# Patient Record
Sex: MALE | Race: WHITE | NOT HISPANIC OR LATINO | Employment: FULL TIME | URBAN - METROPOLITAN AREA
[De-identification: names, ages, dates, MRNs, and addresses within clinical notes are randomized per-mention and may not be internally consistent; named-entity substitution may affect disease eponyms.]

---

## 2017-06-19 DIAGNOSIS — N40.1 BENIGN NON-NODULAR PROSTATIC HYPERPLASIA WITH LOWER URINARY TRACT SYMPTOMS: ICD-10-CM

## 2017-06-19 NOTE — TELEPHONE ENCOUNTER
Mychart sent to see if dose increase has been helpful    Flomax         Last Written Prescription Date: 11/3/16  Last Fill Quantity: 180, # refills: 1    Last Office Visit with [facility], [facility] or [facility] prescribing provider:  11/3/16   Future Office Visit:      BP Readings from Last 3 Encounters:   11/03/16 120/70   09/19/16 110/60   02/26/16 112/82

## 2017-06-20 RX ORDER — TAMSULOSIN HYDROCHLORIDE 0.4 MG/1
CAPSULE ORAL
Qty: 180 CAPSULE | Refills: 0 | Status: SHIPPED | OUTPATIENT
Start: 2017-06-20 | End: 2017-09-14

## 2017-06-20 NOTE — TELEPHONE ENCOUNTER
Spoke with patient and he states that it is working very well for him    Prescription approved per [facility] Refill Protocol.  Suzie Timmons RN, BSN

## 2017-09-14 DIAGNOSIS — N40.1 BENIGN NON-NODULAR PROSTATIC HYPERPLASIA WITH LOWER URINARY TRACT SYMPTOMS: ICD-10-CM

## 2017-09-14 RX ORDER — TAMSULOSIN HYDROCHLORIDE 0.4 MG/1
CAPSULE ORAL
Qty: 180 CAPSULE | Refills: 0 | Status: SHIPPED | OUTPATIENT
Start: 2017-09-14 | End: 2017-12-15

## 2017-09-14 NOTE — TELEPHONE ENCOUNTER
Prescription approved per [facility] Refill Protocol.  Suzie Timmons RN, BSN

## 2017-09-14 NOTE — TELEPHONE ENCOUNTER
Pending Prescriptions:                       Disp   Refills    tamsulosin (FLOMAX) 0.4 MG capsule [Pharm*180 ca*0            Sig: TAKE 2 CAPSULES (0.8 MG) BY MOUTH DAILY             Last Written Prescription Date: 06/20/2017  Last Fill Quantity: 180, # refills: 0    Last Office Visit with [facility], [facility] or [facility] prescribing provider:  11/03/2016   Future Office Visit:      BP Readings from Last 3 Encounters:   11/03/16 120/70   09/19/16 110/60   02/26/16 112/82     Bill BOGGS

## 2017-11-11 ENCOUNTER — OFFICE VISIT (OUTPATIENT)
Dept: URGENT CARE | Facility: URGENT CARE | Age: 57
End: 2017-11-11
Payer: COMMERCIAL

## 2017-11-11 VITALS
HEIGHT: 70 IN | WEIGHT: 190 LBS | OXYGEN SATURATION: 96 % | DIASTOLIC BLOOD PRESSURE: 60 MMHG | TEMPERATURE: 97.2 F | SYSTOLIC BLOOD PRESSURE: 102 MMHG | BODY MASS INDEX: 27.2 KG/M2 | HEART RATE: 50 BPM

## 2017-11-11 DIAGNOSIS — R31.0 GROSS HEMATURIA: Primary | ICD-10-CM

## 2017-11-11 LAB
ALBUMIN UR-MCNC: >=300 MG/DL
APPEARANCE UR: ABNORMAL
BACTERIA #/AREA URNS HPF: ABNORMAL /HPF
BILIRUB UR QL STRIP: NEGATIVE
COLOR UR AUTO: ABNORMAL
GLUCOSE UR STRIP-MCNC: NEGATIVE MG/DL
HGB UR QL STRIP: ABNORMAL
KETONES UR STRIP-MCNC: NEGATIVE MG/DL
LEUKOCYTE ESTERASE UR QL STRIP: ABNORMAL
NITRATE UR QL: NEGATIVE
PH UR STRIP: 7 PH (ref 5–7)
RBC #/AREA URNS AUTO: ABNORMAL /HPF
SOURCE: ABNORMAL
SP GR UR STRIP: 1.02 (ref 1–1.03)
UROBILINOGEN UR STRIP-ACNC: 0.2 EU/DL (ref 0.2–1)
WBC #/AREA URNS AUTO: ABNORMAL /HPF

## 2017-11-11 PROCEDURE — 99213 OFFICE O/P EST LOW 20 MIN: CPT | Performed by: FAMILY MEDICINE

## 2017-11-11 PROCEDURE — 87086 URINE CULTURE/COLONY COUNT: CPT | Performed by: FAMILY MEDICINE

## 2017-11-11 PROCEDURE — 81001 URINALYSIS AUTO W/SCOPE: CPT | Performed by: FAMILY MEDICINE

## 2017-11-11 RX ORDER — SULFAMETHOXAZOLE/TRIMETHOPRIM 800-160 MG
1 TABLET ORAL 2 TIMES DAILY
Qty: 14 TABLET | Refills: 0 | Status: SHIPPED | OUTPATIENT
Start: 2017-11-11 | End: 2017-11-18

## 2017-11-11 NOTE — PATIENT INSTRUCTIONS
What is Hematuria?  Blood in your urine is a condition known as hematuria. Most of the time, the cause of hematuria is not serious. But, never ignore blood in the urine. Your doctor can evaluate you to find the cause of the bleeding and treat it, if needed.  Types of hematuria    Gross hematuria means that the blood can be seen by the naked eye. The urine may look pinkish, brownish, or bright red.    Microscopic hematuria means that the urine is clear, but blood cells can be seen when urine is looked at under a microscope or tested in a lab.  Both types of hematuria can have the same causes. Neither one is necessarily more serious than the other. With either type, you may have other symptoms, such as pain, pressure, or burning when you urinate, abdominal pain, or back pain. Or, you may not have any other symptoms. No matter how much blood is found, the cause of the bleeding needs to be identified.  Finding the cause of hematuria  To evaluate your condition, your doctor will first confirm that blood is indeed present. Then other tests are done to pinpoint where the blood is coming from and why. Your doctor will decide which tests will best determine the cause of your hematuria. Some common tests are listed below.    History and physical exam    Lab tests may include urinalysis, a urine culture, a urine cytology, and various blood tests    Cystoscopy    Computed tomography (CT) or CT urography    Magnetic resonance imaging (MRI) or MR urography    Ultrasound of the kidney    Kidney biopsy  Causes of hematuria include the very benign (exercised induced hematuria) to the very severe (cancer of the urinary system). A variety of treatments are available depending on the cause.  Date Last Reviewed: 12/2/2016 2000-2017 [employer] [address] [place] [address], [place] [address]. All rights reserved. This information is not intended as a substitute for professional medical care. Always follow your healthcare  professional's instructions.        Hematuria: Possible Causes     Many things can lead to blood in the urine (hematuria). The blood may be seen with the eye (macroscopic or gross hematuria). Or it may only be seen when the urine is looked at under a microscope (microscopic hematuria). Some of the most common causes of blood in the urine are listed below. Often, no cause for the blood can be found. This is called idiopathic hematuria.    Kidney or bladder stones are collections of crystals. They form in the urine. Stones may be found anywhere in the urinary tract. But they form most often in the kidneys or bladder. In addition to blood in the urine, they can cause severe pain.    BPH stands for benign prostatic hyperplasia. It is enlargement of the prostate gland. It happens as men age. BPH often causes problems with urination. It sometimes causes blood in the urine.    A urinary tract infection (UTI) is due to bacteria growing in the urinary tract. It can cause blood in the urine. Other symptoms include burning or pain with urination. You may need to urinate often or urgently. You may also have a fever.    Damage to the urinary tract may cause blood in the urine. This damage may be due to a blow or accident. It may also result from the use of a urinary catheter. Very hard exercise may sometimes irritate the urinary tract and cause bleeding.    Cancer may occur anywhere in the urinary tract. A tumor may sometimes cause no symptoms other than bleeding.     Other possible causes of bleeding include:    Prostatitis (infection of the prostate gland)    Taking anticoagulants    Blockage in the urinary tract    Disease or inflammation of the kidney    Cystic diseases of the kidneys    Sickle cell anemia    Vigorous exercise    Endometriosis  Date Last Reviewed: 12/1/2016 2000-2017 [employer]. [address] [place] [address], [place] [address]. All rights reserved. This information is not intended as a substitute  for professional medical care. Always follow your healthcare professional's instructions.

## 2017-11-11 NOTE — MR AVS SNAPSHOT
After Visit Summary   11/11/2017    Bill Neri    MRN: [id number]           Patient Information     Date Of Birth          1960        Visit Information        Provider Department      11/11/2017 8:05 AM Kendra Westbrook MD [facility]        Today's Diagnoses     Gross hematuria    -  1      Care Instructions      What is Hematuria?  Blood in your urine is a condition known as hematuria. Most of the time, the cause of hematuria is not serious. But, never ignore blood in the urine. Your doctor can evaluate you to find the cause of the bleeding and treat it, if needed.  Types of hematuria    Gross hematuria means that the blood can be seen by the naked eye. The urine may look pinkish, brownish, or bright red.    Microscopic hematuria means that the urine is clear, but blood cells can be seen when urine is looked at under a microscope or tested in a lab.  Both types of hematuria can have the same causes. Neither one is necessarily more serious than the other. With either type, you may have other symptoms, such as pain, pressure, or burning when you urinate, abdominal pain, or back pain. Or, you may not have any other symptoms. No matter how much blood is found, the cause of the bleeding needs to be identified.  Finding the cause of hematuria  To evaluate your condition, your doctor will first confirm that blood is indeed present. Then other tests are done to pinpoint where the blood is coming from and why. Your doctor will decide which tests will best determine the cause of your hematuria. Some common tests are listed below.    History and physical exam    Lab tests may include urinalysis, a urine culture, a urine cytology, and various blood tests    Cystoscopy    Computed tomography (CT) or CT urography    Magnetic resonance imaging (MRI) or MR urography    Ultrasound of the kidney    Kidney biopsy  Causes of hematuria include the very benign (exercised induced hematuria)  to the very severe (cancer of the urinary system). A variety of treatments are available depending on the cause.  Date Last Reviewed: 12/2/2016 2000-2017 The "[employer] [address] [place] [address], [place] [address]. All rights reserved. This information is not intended as a substitute for professional medical care. Always follow your healthcare professional's instructions.        Hematuria: Possible Causes     Many things can lead to blood in the urine (hematuria). The blood may be seen with the eye (macroscopic or gross hematuria). Or it may only be seen when the urine is looked at under a microscope (microscopic hematuria). Some of the most common causes of blood in the urine are listed below. Often, no cause for the blood can be found. This is called idiopathic hematuria.    Kidney or bladder stones are collections of crystals. They form in the urine. Stones may be found anywhere in the urinary tract. But they form most often in the kidneys or bladder. In addition to blood in the urine, they can cause severe pain.    BPH stands for benign prostatic hyperplasia. It is enlargement of the prostate gland. It happens as men age. BPH often causes problems with urination. It sometimes causes blood in the urine.    A urinary tract infection (UTI) is due to bacteria growing in the urinary tract. It can cause blood in the urine. Other symptoms include burning or pain with urination. You may need to urinate often or urgently. You may also have a fever.    Damage to the urinary tract may cause blood in the urine. This damage may be due to a blow or accident. It may also result from the use of a urinary catheter. Very hard exercise may sometimes irritate the urinary tract and cause bleeding.    Cancer may occur anywhere in the urinary tract. A tumor may sometimes cause no symptoms other than bleeding.     Other possible causes of bleeding include:    Prostatitis (infection of the prostate gland)    Taking  "anticoagulants    Blockage in the urinary tract    Disease or inflammation of the kidney    Cystic diseases of the kidneys    Sickle cell anemia    Vigorous exercise    Endometriosis  Date Last Reviewed: 12/1/2016 2000-2017 [employer]. [address] [place] [address], [place] [address]. All rights reserved. This information is not intended as a substitute for professional medical care. Always follow your healthcare professional's instructions.                Follow-ups after your visit        Who to contact     If you have questions or need follow up information about today's clinic visit or your schedule please contact [facility] directly at [phone].  Normal or non-critical lab and imaging results will be communicated to you by [employer], letter or phone within 4 business days after the clinic has received the results. If you do not hear from us within 7 days, please contact the clinic through [employer] or phone. If you have a critical or abnormal lab result, we will notify you by phone as soon as possible.  Submit refill requests through [employer] or call your pharmacy and they will forward the refill request to us. Please allow 3 business days for your refill to be completed.          Additional Information About Your Visit        MyChart Information     [employer] gives you secure access to your electronic health record. If you see a primary care provider, you can also send messages to your care team and make appointments. If you have questions, please call your primary care clinic.  If you do not have a primary care provider, please call [phone] and they will assist you.        Care EveryWhere ID     This is your Care EveryWhere ID. This could be used by other organizations to access your [facility] medical records  [id number]        Your Vitals Were     Pulse Temperature Height Pulse Oximetry BMI (Body Mass Index)       50 97.2  F (36.2  C) (Oral) 5' 10\" (1.778 m) 96% 27.26 kg/m2  "       Blood Pressure from Last 3 Encounters:   11/11/17 102/60   11/03/16 120/70   09/19/16 110/60    Weight from Last 3 Encounters:   11/11/17 190 lb (86.2 kg)   11/03/16 199 lb 12.8 oz (90.6 kg)   09/19/16 197 lb 1.6 oz (89.4 kg)              We Performed the Following     *UA reflex to Microscopic and Culture ([place] (except [place] and [place])     Urine Culture Aerobic Bacterial     Urine Microscopic          Today's Medication Changes          These changes are accurate as of: 11/11/17  8:48 AM.  If you have any questions, ask your nurse or doctor.               Start taking these medicines.        Dose/Directions    sulfamethoxazole-trimethoprim 800-160 MG per tablet   Commonly known as:  BACTRIM DS   Used for:  Gross hematuria   Started by:  Kendra Westbrook MD        Dose:  1 tablet   Take 1 tablet by mouth 2 times daily for 7 days   Quantity:  14 tablet   Refills:  0            Where to get your medicines      These medications were sent to [id number]    Hours:  Tech issues with their phone system Phone:  [phone]     sulfamethoxazole-trimethoprim 800-160 MG per tablet                Primary Care Provider Office Phone # Fax #    Jose Ruiz -[phone] [address] [facility] [address]        Equal Access to Services     DEVORAH [facility] CenterCHRISTOPHE AH: Hadii aad ku hadasho Soomaali, waaxda luqadaha, qaybta kaalmada adeegyada, tomasz luis. So [facility] [phone].    ATENCIÓN: Si habla español, tiene a clarke disposición servicios gratuitos de asistencia lingüística. Llame al [phone].    We comply with applicable federal civil rights laws and [place] laws. We do not discriminate on the basis of race, color, national origin, age, disability, sex, sexual orientation, or gender identity.            Thank you!     Thank you for choosing [facility]  for your care. Our goal is always to provide you with excellent care. Hearing back from our patients is one way we can continue to improve our services. Please take a few minutes to complete the written survey that you may receive in the mail after your visit with us. Thank you!             Your Updated Medication List - Protect others around you: Learn how to safely use, store and throw away your medicines at www.disposemymeds.org.          This list is accurate as of: 11/11/17  8:48 AM.  Always use your most recent med list.                   Brand Name Dispense Instructions for use Diagnosis    ADVIL PO      Take 200 mg by mouth as needed for moderate pain        sulfamethoxazole-trimethoprim 800-160 MG per tablet    BACTRIM DS    14 tablet    Take 1 tablet by mouth 2 times daily for 7 days    Gross hematuria       tamsulosin 0.4 MG capsule    FLOMAX    180 capsule    TAKE 2 CAPSULES (0.8 MG) BY MOUTH DAILY    Benign non-nodular prostatic hyperplasia with lower urinary tract symptoms

## 2017-11-11 NOTE — PROGRESS NOTES
"SUBJECTIVE:  Chief Complaint   Patient presents with     Urinary Problem     back pain, hematuria onset today - declines GC testing      Urgent Care      Bill Neri is a 57 year old male who  presents today for blood in the urine  Symptoms of dysuria and blood in the urine have been going on for 1day(s).     sudden onset and still presentand mild.  There is no history of fever, chills, nausea or vomiting.  No history of  penile discharge. This patient does   have a history of urinary tract infections/  BPH. Patient denies long duration, rigors, flank pain and temperature > 101 degrees F.   He feels he is able to empty his bladder adequately    Past Medical History:   Diagnosis Date     Anxiety state, unspecified      Other and unspecified hyperlipidemia        ALLERGIES:  Review of patient's allergies indicates no known allergies.      Current Outpatient Prescriptions on File Prior to Visit:  tamsulosin (FLOMAX) 0.4 MG capsule TAKE 2 CAPSULES (0.8 MG) BY MOUTH DAILY   Ibuprofen (ADVIL PO) Take 200 mg by mouth as needed for moderate pain     No current facility-administered medications on file prior to visit.     Social History   Substance Use Topics     Smoking status: Never Smoker     Smokeless tobacco: Never Used     Alcohol use Yes      Comment: 4 beers a week       Family History   Problem Relation Age of Onset     Family History Negative No family hx of      Cancer - colorectal No family hx of        ROS:   CONSTITUTIONAL:NEGATIVE for fever, chills, change in weight  INTEGUMENTARY/SKIN: NEGATIVE for worrisome rashes, moles or lesions  ENT/MOUTH: NEGATIVE for ear, mouth and throat problems  RESP:NEGATIVE for significant cough or SOB  GI: NEGATIVE for nausea, abdominal pain, heartburn, or change in bowel habits    OBJECTIVE:  /60 (BP Location: Right arm, Patient Position: Chair, Cuff Size: Adult Regular)  Pulse 50  Temp 97.2  F (36.2  C) (Oral)  Ht 5' 10\" (1.778 m)  Wt 190 lb (86.2 kg)  SpO2 96%  " BMI 27.26 kg/m2  GENERAL APPEARANCE: healthy, alert and no distress  RESP: lungs clear to auscultation - no rales, rhonchi or wheezes  CV: regular rates and rhythm, normal S1 S2, no murmur noted  ABDOMEN:  soft, nontender, no HSM or masses and bowel sounds normal  BACK: No CVA tenderness  SKIN: no suspicious lesions or rashes  Declined prostate/ testicular exam    Results for orders placed or performed in visit on 11/11/17   *UA reflex to Microscopic and Culture ([facility] (except [place] and [place])   Result Value Ref Range    Color Urine Red     Appearance Urine Cloudy     Glucose Urine Negative NEG^Negative mg/dL    Bilirubin Urine Negative NEG^Negative    Ketones Urine Negative NEG^Negative mg/dL    Specific Gravity Urine 1.025 1.003 - 1.035    Blood Urine Large (A) NEG^Negative    pH Urine 7.0 5.0 - 7.0 pH    Protein Albumin Urine >=300 (A) NEG^Negative mg/dL    Urobilinogen Urine 0.2 0.2 - 1.0 EU/dL    Nitrite Urine Negative NEG^Negative    Leukocyte Esterase Urine Large (A) NEG^Negative    Source Midstream Urine    Urine Microscopic   Result Value Ref Range    WBC Urine 5-10 (A) OTO2^O - 2 /HPF    RBC Urine  (A) OTO2^O - 2 /HPF    Bacteria Urine Few (A) NEG^Negative /HPF         ASSESSMENT:   Gross hematuria      - *UA reflex to Microscopic and Culture ([place] [facility] (except [place] and [place])  - Urine Microscopic  - Urine Culture Aerobic Bacterial  - sulfamethoxazole-trimethoprim (BACTRIM DS) 800-160 MG per tablet; Take 1 tablet by mouth 2 times daily for 7 days   :  We discussed the different possible causes of hematuria and that a UTI is the most common cause.  Other causes include trauma, anticoagulants, malignancy, kidney disease. .  Patient will be emprically started on antibiotics and will monitor if hematuria resolves.  Urine culture will also be performed to look for urinary tract infection.  Patient advised to  Check/ call for urine culture  results.  Patient education materials about hematuria were provided   If persistent concerns about hematuria, pt. Will f/u with primary care and/ or arrange further evaluation with urology of other possible causes of hematuria

## 2017-11-11 NOTE — NURSING NOTE
"Bill Neri;   Chief Complaint   Patient presents with     Urinary Problem     back pain, hematuria onset today - declines GC testing      Urgent Care     Initial /60 (BP Location: Right arm, Patient Position: Chair, Cuff Size: Adult Regular)  Pulse 50  Temp 97.2  F (36.2  C) (Oral)  Ht 5' 10\" (1.778 m)  Wt 190 lb (86.2 kg)  SpO2 96%  BMI 27.26 kg/m2 Estimated body mass index is 27.26 kg/(m^2) as calculated from the following:    Height as of this encounter: 5' 10\" (1.778 m).    Weight as of this encounter: 190 lb (86.2 kg)..  BP completed using cuff size regular.  Monica Rossi R.N.  "

## 2017-11-12 LAB
BACTERIA SPEC CULT: NORMAL
SPECIMEN SOURCE: NORMAL

## 2017-12-01 ENCOUNTER — TELEPHONE (OUTPATIENT)
Dept: FAMILY MEDICINE | Facility: CLINIC | Age: 57
End: 2017-12-01

## 2017-12-01 ENCOUNTER — OFFICE VISIT (OUTPATIENT)
Dept: URGENT CARE | Facility: URGENT CARE | Age: 57
End: 2017-12-01
Payer: COMMERCIAL

## 2017-12-01 VITALS
OXYGEN SATURATION: 98 % | BODY MASS INDEX: 27.26 KG/M2 | TEMPERATURE: 98.2 F | DIASTOLIC BLOOD PRESSURE: 70 MMHG | SYSTOLIC BLOOD PRESSURE: 110 MMHG | HEART RATE: 76 BPM | WEIGHT: 190 LBS

## 2017-12-01 DIAGNOSIS — N30.01 ACUTE CYSTITIS WITH HEMATURIA: ICD-10-CM

## 2017-12-01 DIAGNOSIS — R82.90 NONSPECIFIC FINDING ON EXAMINATION OF URINE: Primary | ICD-10-CM

## 2017-12-01 DIAGNOSIS — R30.0 DYSURIA: ICD-10-CM

## 2017-12-01 DIAGNOSIS — R35.0 FREQUENCY OF URINATION: ICD-10-CM

## 2017-12-01 LAB
ALBUMIN UR-MCNC: 100 MG/DL
APPEARANCE UR: ABNORMAL
BACTERIA #/AREA URNS HPF: ABNORMAL /HPF
BILIRUB UR QL STRIP: NEGATIVE
COLOR UR AUTO: YELLOW
GLUCOSE UR STRIP-MCNC: NEGATIVE MG/DL
HGB UR QL STRIP: ABNORMAL
KETONES UR STRIP-MCNC: NEGATIVE MG/DL
LEUKOCYTE ESTERASE UR QL STRIP: ABNORMAL
NITRATE UR QL: NEGATIVE
PH UR STRIP: 7.5 PH (ref 5–7)
RBC #/AREA URNS AUTO: ABNORMAL /HPF
SOURCE: ABNORMAL
SP GR UR STRIP: 1.02 (ref 1–1.03)
UROBILINOGEN UR STRIP-ACNC: 0.2 EU/DL (ref 0.2–1)
WBC #/AREA URNS AUTO: ABNORMAL /HPF

## 2017-12-01 PROCEDURE — 81001 URINALYSIS AUTO W/SCOPE: CPT | Performed by: FAMILY MEDICINE

## 2017-12-01 PROCEDURE — 99213 OFFICE O/P EST LOW 20 MIN: CPT | Performed by: FAMILY MEDICINE

## 2017-12-01 PROCEDURE — 87086 URINE CULTURE/COLONY COUNT: CPT | Performed by: FAMILY MEDICINE

## 2017-12-01 RX ORDER — CEFDINIR 300 MG/1
300 CAPSULE ORAL 2 TIMES DAILY
Qty: 20 CAPSULE | Refills: 0 | Status: SHIPPED | OUTPATIENT
Start: 2017-12-01 | End: 2018-01-12

## 2017-12-01 NOTE — MR AVS SNAPSHOT
After Visit Summary   12/1/2017    Bill Neri    MRN: [id number]           Patient Information     Date Of Birth          1960        Visit Information        Provider Department      12/1/2017 5:55 PM Kendra Westbrook MD [facility] [place] [facility]        Today's Diagnoses     Nonspecific finding on examination of urine    -  1    Frequency of urination        Dysuria        Acute cystitis with hematuria          Care Instructions      Understanding Urinary Tract Infections (UTIs)  Most UTIs are caused by bacteria, although they may also be caused by viruses or fungi. Bacteria from the bowel are the most common source of infection. The infection may start because of any of the following:    Sexual activity. During sex, bacteria can travel from the penis, vagina, or rectum into the urethra.     Bacteria on the skin outside the rectum may travel into the urethra. This is more common in women since the rectum and urethra are closer to each other than in men. Wiping from front to back after using the toilet and keeping the area clean can help prevent germs from getting to the urethra.    Blockage of urine flow through the urinary tract. If urine sits too long, germs may start to grow out of control.      Parts of the urinary tract  The infection can occur in any part of the urinary tract.    The kidneys collect and store urine.    The ureters carry urine from the kidneys to the bladder.    The bladder holds urine until you are ready to let it out.    The urethra carries urine from the bladder out of the body. It is shorter in women, so bacteria can move through it more easily. The urethra is longer in men, so a UTI is less likely to reach the bladder or kidneys in men.  Date Last Reviewed: 1/1/2017 2000-2017 [employer]. [address] [place] [address], [place] [address]. All rights reserved. This information is not intended as a substitute for professional medical care. Always  follow your healthcare professional's instructions.        Understanding Urinary Tract Infections (UTIs)  Most UTIs are caused by bacteria, although they may also be caused by viruses or fungi. Bacteria from the bowel are the most common source of infection. The infection may start because of any of the following:    Sexual activity. During sex, bacteria can travel from the penis, vagina, or rectum into the urethra.     Bacteria on the skin outside the rectum may travel into the urethra. This is more common in women since the rectum and urethra are closer to each other than in men. Wiping from front to back after using the toilet and keeping the area clean can help prevent germs from getting to the urethra.    Blockage of urine flow through the urinary tract. If urine sits too long, germs may start to grow out of control.      Parts of the urinary tract  The infection can occur in any part of the urinary tract.    The kidneys collect and store urine.    The ureters carry urine from the kidneys to the bladder.    The bladder holds urine until you are ready to let it out.    The urethra carries urine from the bladder out of the body. It is shorter in women, so bacteria can move through it more easily. The urethra is longer in men, so a UTI is less likely to reach the bladder or kidneys in men.  Date Last Reviewed: 1/1/2017 2000-2017 [employer]. [address] [place] [address], [place] [address]. All rights reserved. This information is not intended as a substitute for professional medical care. Always follow your healthcare professional's instructions.                Follow-ups after your visit        Who to contact     If you have questions or need follow up information about today's clinic visit or your schedule please contact [facility] directly at [phone].  Normal or non-critical lab and imaging results will be communicated to you by MyChart, letter or phone within 4 business days  after the clinic has received the results. If you do not hear from us within 7 days, please contact the clinic through [employer] or phone. If you have a critical or abnormal lab result, we will notify you by phone as soon as possible.  Submit refill requests through [employer] or call your pharmacy and they will forward the refill request to us. Please allow 3 business days for your refill to be completed.          Additional Information About Your Visit        joblocalharCamiant Information     [employer] gives you secure access to your electronic health record. If you see a primary care provider, you can also send messages to your care team and make appointments. If you have questions, please call your primary care clinic.  If you do not have a primary care provider, please call [phone] and they will assist you.        Care EveryWhere ID     This is your Care EveryWhere ID. This could be used by other organizations to access your [place] medical records  [id number]        Your Vitals Were     Pulse Temperature Pulse Oximetry BMI (Body Mass Index)          76 98.2  F (36.8  C) (Oral) 98% 27.26 kg/m2         Blood Pressure from Last 3 Encounters:   12/01/17 110/70   11/11/17 102/60   11/03/16 120/70    Weight from Last 3 Encounters:   12/01/17 190 lb (86.2 kg)   11/11/17 190 lb (86.2 kg)   11/03/16 199 lb 12.8 oz (90.6 kg)              We Performed the Following     UA reflex to Microscopic and Culture     Urine Culture Aerobic Bacterial     Urine Microscopic          Today's Medication Changes          These changes are accurate as of: 12/1/17  6:40 PM.  If you have any questions, ask your nurse or doctor.               Start taking these medicines.        Dose/Directions    cefdinir 300 MG capsule   Commonly known as:  OMNICEF   Used for:  Acute cystitis with hematuria   Started by:  Kendra Westbrook MD        Dose:  300 mg   Take 1 capsule (300 mg) by mouth 2 times daily   Quantity:  20 capsule   Refills:  0             Where to get your medicines      These medications were sent to [facility] [address] [place] [facility] [address] [facility]  [address] [facility] [address]    Hours:  Tech issues with their phone system Phone:  [phone]     cefdinir 300 MG capsule                Primary Care Provider Office Phone # Fax #    Jose Ruiz [phone] [address]  [facility] [address]        Equal Access to Services     STEPHIE JULIO : Hadii aad ku hadasho Soomaali, waaxda luqadaha, qaybta kaalmada adeegyada, waxay idiin hayaan adeeg kharash la'lionel . So [facility] [phone].    ATENCIÓN: Si habla español, tiene a clarke disposición servicios gratuitos de asistencia lingüística. [facility] [phone].    We comply with applicable federal civil rights laws and [place] laws. We do not discriminate on the basis of race, color, national origin, age, disability, sex, sexual orientation, or gender identity.            Thank you!     Thank you for choosing [facility]  for your care. Our goal is always to provide you with excellent care. Hearing back from our patients is one way we can continue to improve our services. Please take a few minutes to complete the written survey that you may receive in the mail after your visit with us. Thank you!             Your Updated Medication List - Protect others around you: Learn how to safely use, store and throw away your medicines at www.disposemymeds.org.          This list is accurate as of: 12/1/17  6:40 PM.  Always use your most recent med list.                   Brand Name Dispense Instructions for use Diagnosis    ADVIL PO      Take 200 mg by mouth as needed for moderate pain        cefdinir 300 MG capsule    OMNICEF    20 capsule    Take 1 capsule (300 mg) by mouth 2 times daily    Acute cystitis with hematuria       tamsulosin 0.4 MG capsule    FLOMAX    180 capsule    TAKE 2 CAPSULES (0.8 MG) BY MOUTH DAILY    Benign non-nodular  prostatic hyperplasia with lower urinary tract symptoms

## 2017-12-01 NOTE — TELEPHONE ENCOUNTER
Bill Neri is a 57 year old male who calls with dysuria.  He wonders if Dr. Ruiz would treat over the phone?  Pt is flying out of town tomorrow.  Dr. Ruiz, pt asked if you would treat over the phone? Informed unlikely but that we would ask you.     Description:  Urinary urgency, cloudy urine, pain with urination  Onset/duration:  Late last evening  Taking Flomax as prescribed since 11/11/17 UC visit but if he misses a dose his symptoms flare  Associated symptoms:  A little bit of back pain    We recommended same day or UC. Offered same day appointment. Schedule conflict.  Pt will present to  UC 5 PM unless PCP treats w/o visit.  Cornell Strong RN

## 2017-12-01 NOTE — TELEPHONE ENCOUNTER
Recommend eval for infection in UC.  We referred him to urology last time I saw him 1 year ago and would recommend that as well.

## 2017-12-02 NOTE — PATIENT INSTRUCTIONS
Understanding Urinary Tract Infections (UTIs)  Most UTIs are caused by bacteria, although they may also be caused by viruses or fungi. Bacteria from the bowel are the most common source of infection. The infection may start because of any of the following:    Sexual activity. During sex, bacteria can travel from the penis, vagina, or rectum into the urethra.     Bacteria on the skin outside the rectum may travel into the urethra. This is more common in women since the rectum and urethra are closer to each other than in men. Wiping from front to back after using the toilet and keeping the area clean can help prevent germs from getting to the urethra.    Blockage of urine flow through the urinary tract. If urine sits too long, germs may start to grow out of control.      Parts of the urinary tract  The infection can occur in any part of the urinary tract.    The kidneys collect and store urine.    The ureters carry urine from the kidneys to the bladder.    The bladder holds urine until you are ready to let it out.    The urethra carries urine from the bladder out of the body. It is shorter in women, so bacteria can move through it more easily. The urethra is longer in men, so a UTI is less likely to reach the bladder or kidneys in men.  Date Last Reviewed: 1/1/2017 2000-2017 [employer]. [address] [place] [address], [place] [id number]. All rights reserved. This information is not intended as a substitute for professional medical care. Always follow your healthcare professional's instructions.        Understanding Urinary Tract Infections (UTIs)  Most UTIs are caused by bacteria, although they may also be caused by viruses or fungi. Bacteria from the bowel are the most common source of infection. The infection may start because of any of the following:    Sexual activity. During sex, bacteria can travel from the penis, vagina, or rectum into the urethra.     Bacteria on the skin outside the rectum may  travel into the urethra. This is more common in women since the rectum and urethra are closer to each other than in men. Wiping from front to back after using the toilet and keeping the area clean can help prevent germs from getting to the urethra.    Blockage of urine flow through the urinary tract. If urine sits too long, germs may start to grow out of control.      Parts of the urinary tract  The infection can occur in any part of the urinary tract.    The kidneys collect and store urine.    The ureters carry urine from the kidneys to the bladder.    The bladder holds urine until you are ready to let it out.    The urethra carries urine from the bladder out of the body. It is shorter in women, so bacteria can move through it more easily. The urethra is longer in men, so a UTI is less likely to reach the bladder or kidneys in men.  Date Last Reviewed: 1/1/2017 2000-2017 [employer]. [address] [place] [address], [place] [address]. All rights reserved. This information is not intended as a substitute for professional medical care. Always follow your healthcare professional's instructions.

## 2017-12-02 NOTE — NURSING NOTE
"Bill Neri is a 57 year old male.      Chief Complaint   Patient presents with     Urgent Care     Urinary Problem     pt is here for a possible UTI - he had one a couple weeks ago and was treated - the sx never went away 100% - he now has back pain, cluody urine and frequency        Initial /70 (BP Location: Right arm, Cuff Size: Adult Large)  Pulse 76  Temp 98.2  F (36.8  C) (Oral)  Wt 190 lb (86.2 kg)  SpO2 98%  BMI 27.26 kg/m2 Estimated body mass index is 27.26 kg/(m^2) as calculated from the following:    Height as of 11/11/17: 5' 10\" (1.778 m).    Weight as of this encounter: 190 lb (86.2 kg).  Medication Reconciliation: complete      Questioned patient about current smoking habits.  Pt. has never smoked.      Karlene Cintron CMA      "

## 2017-12-02 NOTE — PROGRESS NOTES
SUBJECTIVE:  Chief Complaint   Patient presents with     Urgent Care     Urinary Problem     pt is here for a possible UTI - he had one a couple weeks ago and was treated - the sx never went away 100% - he now has back pain, cluody urine and frequency         Bill Neri is a 57 year old male who  presents today for a possible UTI. Symptoms of dysuria, urgency, frequency and burning have been going on for 1month(s).  Hematuria no.  gradual onset, still present and constantand moderate.  There is no history of fever, chills, nausea or vomiting.  No history of   penile discharge. This patient does   have a history of urinary tract infections.  He had hematuria 3 weeks ago-  Treated with bactrim,  And culture showed normal genital nalini.  He felt that the dysuria symptoms did not resolve with the bactrim, though hematuria resolved. Patient denies rigors, flank pain, temperature > 101 degrees F. and Vomiting, significant nausea or diarrhea  .  No pain in region of the prostate, no testicle pain    Past Medical History:   Diagnosis Date     Anxiety state, unspecified      Other and unspecified hyperlipidemia        ALLERGIES:  Review of patient's allergies indicates no known allergies.      Current Outpatient Prescriptions on File Prior to Visit:  tamsulosin (FLOMAX) 0.4 MG capsule TAKE 2 CAPSULES (0.8 MG) BY MOUTH DAILY   Ibuprofen (ADVIL PO) Take 200 mg by mouth as needed for moderate pain     No current facility-administered medications on file prior to visit.     Social History   Substance Use Topics     Smoking status: Never Smoker     Smokeless tobacco: Never Used     Alcohol use Yes      Comment: 4 beers a week       Family History   Problem Relation Age of Onset     Family History Negative No family hx of      Cancer - colorectal No family hx of        ROS:   CONSTITUTIONAL:NEGATIVE for fever, chills, change in weight  INTEGUMENTARY/SKIN: NEGATIVE for worrisome rashes, moles or lesions  EYES: NEGATIVE for  vision changes or irritation  ENT/MOUTH: NEGATIVE for ear, mouth and throat problems  RESP:NEGATIVE for significant cough or SOB  GI: NEGATIVE for nausea, abdominal pain, heartburn, or change in bowel habits    OBJECTIVE:  /70 (BP Location: Right arm, Cuff Size: Adult Large)  Pulse 76  Temp 98.2  F (36.8  C) (Oral)  Wt 190 lb (86.2 kg)  SpO2 98%  BMI 27.26 kg/m2  GENERAL APPEARANCE: healthy, alert and no distress  RESP: lungs clear to auscultation - no rales, rhonchi or wheezes  CV: regular rates and rhythm, normal S1 S2, no murmur noted  ABDOMEN:  soft, nontender, no HSM or masses and bowel sounds normal  BACK: No CVA tenderness  SKIN: no suspicious lesions or rashes      Results for orders placed or performed in visit on 12/01/17   UA reflex to Microscopic and Culture   Result Value Ref Range    Color Urine Yellow     Appearance Urine Cloudy     Glucose Urine Negative NEG^Negative mg/dL    Bilirubin Urine Negative NEG^Negative    Ketones Urine Negative NEG^Negative mg/dL    Specific Gravity Urine 1.025 1.003 - 1.035    Blood Urine Large (A) NEG^Negative    pH Urine 7.5 (H) 5.0 - 7.0 pH    Protein Albumin Urine 100 (A) NEG^Negative mg/dL    Urobilinogen Urine 0.2 0.2 - 1.0 EU/dL    Nitrite Urine Negative NEG^Negative    Leukocyte Esterase Urine Large (A) NEG^Negative    Source Midstream Urine    Urine Microscopic   Result Value Ref Range    WBC Urine 25-50 (A) OTO2^O - 2 /HPF    RBC Urine 25-50 (A) OTO2^O - 2 /HPF    Bacteria Urine Few (A) NEG^Negative /HPF     ASSESSMENT:   Frequency of urination     - UA reflex to Microscopic and Culture  - Urine Microscopic  - Urine Culture Aerobic Bacterial    Dysuria     - UA reflex to Microscopic and Culture  - Urine Culture Aerobic Bacterial    Nonspecific finding on examination of urine     - Urine Culture Aerobic Bacterial    Acute cystitis with hematuria     - cefdinir (OMNICEF) 300 MG capsule; Take 1 capsule (300 mg) by mouth 2 times daily       Drink plenty  of fluids.  Prevention and treatment of UTI's discussed.Signs and symptoms of pyelonephritis mentioned.  Follow up with primary care physician if not improving    We discussed that a urine culture is being performed and that if we find that if there is a bacteria in the urine that is resistant to the prescribed antibiotic he/she will receive call to change the antibiotic to better cover the infection.

## 2017-12-03 LAB
BACTERIA SPEC CULT: NO GROWTH
SPECIMEN SOURCE: NORMAL

## 2017-12-06 ENCOUNTER — TELEPHONE (OUTPATIENT)
Dept: FAMILY MEDICINE | Facility: CLINIC | Age: 57
End: 2017-12-06

## 2017-12-06 DIAGNOSIS — N30.01 ACUTE CYSTITIS WITH HEMATURIA: Primary | ICD-10-CM

## 2017-12-06 RX ORDER — SULFAMETHOXAZOLE/TRIMETHOPRIM 800-160 MG
1 TABLET ORAL 2 TIMES DAILY
Qty: 14 TABLET | Refills: 0 | Status: SHIPPED | OUTPATIENT
Start: 2017-12-06 | End: 2019-05-24

## 2017-12-06 NOTE — TELEPHONE ENCOUNTER
Pt returned call.  Make appointment for 12/19.  He would like this sent to:    [facility] [address] [place] [facility] [address] [facility]

## 2017-12-06 NOTE — TELEPHONE ENCOUNTER
Pt calling out of town stating he has not felt any improvement since being on antibiotic for 5 days.  He is still having the same symptoms that he had when he was in for UC.  He won't be back into town until Friday night.  He is wondering if he should be on a different medication    Please advise.    Suzie Timmons RN, BSN

## 2017-12-06 NOTE — TELEPHONE ENCOUNTER
I would have him consider Bactrim DS BID for 1 week and follow-up with me for follow-up in 10-14 days.

## 2017-12-09 ENCOUNTER — NURSE TRIAGE (OUTPATIENT)
Dept: NURSING | Facility: CLINIC | Age: 57
End: 2017-12-09

## 2017-12-09 NOTE — TELEPHONE ENCOUNTER
Robert is out of state right now and having blood in his urine and wanted to know what urology provider her saw 2-3 years ago, reviewed records and found information. Dr. Biju Che at [facility] In [place] and visit date was 02/16/15, he did not want further triage going to try to get back home today and go to urgent care and schedule follow up with urologist as well.     Dagmar Stoddard RN, BSN  [facility]

## 2017-12-15 DIAGNOSIS — N40.1 BENIGN NON-NODULAR PROSTATIC HYPERPLASIA WITH LOWER URINARY TRACT SYMPTOMS: ICD-10-CM

## 2017-12-15 RX ORDER — TAMSULOSIN HYDROCHLORIDE 0.4 MG/1
CAPSULE ORAL
Qty: 180 CAPSULE | Refills: 0 | Status: SHIPPED | OUTPATIENT
Start: 2017-12-15 | End: 2018-03-29

## 2017-12-15 NOTE — TELEPHONE ENCOUNTER
With patient's ongoing blood in urine (has appt with urology) is it ok to continue with this medication?    Suzie Timmons RN, BSN

## 2017-12-22 ENCOUNTER — OFFICE VISIT (OUTPATIENT)
Dept: UROLOGY | Facility: CLINIC | Age: 57
End: 2017-12-22
Payer: COMMERCIAL

## 2017-12-22 VITALS — OXYGEN SATURATION: 98 % | HEIGHT: 70 IN | HEART RATE: 48 BPM | BODY MASS INDEX: 27.2 KG/M2 | WEIGHT: 190 LBS

## 2017-12-22 DIAGNOSIS — R33.9 URINARY RETENTION: ICD-10-CM

## 2017-12-22 DIAGNOSIS — N40.0 ENLARGED PROSTATE: ICD-10-CM

## 2017-12-22 DIAGNOSIS — R31.0 GROSS HEMATURIA: Primary | ICD-10-CM

## 2017-12-22 LAB
ALBUMIN UR-MCNC: NEGATIVE MG/DL
APPEARANCE UR: CLEAR
BILIRUB UR QL STRIP: NEGATIVE
COLOR UR AUTO: YELLOW
GLUCOSE UR STRIP-MCNC: NEGATIVE MG/DL
HGB UR QL STRIP: ABNORMAL
KETONES UR STRIP-MCNC: NEGATIVE MG/DL
LEUKOCYTE ESTERASE UR QL STRIP: ABNORMAL
NITRATE UR QL: NEGATIVE
PH UR STRIP: 5.5 PH (ref 5–7)
RESIDUAL VOLUME (RV) (EXTERNAL): >999
SOURCE: ABNORMAL
SP GR UR STRIP: 1.02 (ref 1–1.03)
UROBILINOGEN UR STRIP-ACNC: 0.2 EU/DL (ref 0.2–1)

## 2017-12-22 PROCEDURE — 81003 URINALYSIS AUTO W/O SCOPE: CPT | Mod: QW | Performed by: UROLOGY

## 2017-12-22 PROCEDURE — 99214 OFFICE O/P EST MOD 30 MIN: CPT | Performed by: UROLOGY

## 2017-12-22 RX ORDER — FINASTERIDE 5 MG/1
5 TABLET, FILM COATED ORAL DAILY
Qty: 90 TABLET | Refills: 3 | Status: SHIPPED | OUTPATIENT
Start: 2017-12-22 | End: 2018-05-10

## 2017-12-22 ASSESSMENT — PAIN SCALES - GENERAL: PAINLEVEL: NO PAIN (0)

## 2017-12-22 NOTE — PROGRESS NOTES
Office Visit Note  [facility]  [phone]    UROLOGIC DIAGNOSES:   Enlarged prostate    CURRENT INTERVENTIONS:   Flomax    HISTORY:   This is a 57 old gentleman who I saw once in 2015. He has a history of an enlarged prostate and takes Flomax. I saw him because he had been having back pain and was wondering if he possibly had kidney stones. At that time his urinalysis was normal and I ordered a renal ultrasound and PSA. Both of these tests were normal as well. Of note, I reviewed the ultrasound images again today and at the time he had a full bladder but did not appear to have an overly distended bladder. He reports that in the interim he had one urinary tract infection in 2016. More recently he had an episode of painless gross hematuria. He is here for evaluation of the hematuria. He has no other symptoms. He has had no further back pain. Today we collected a urine sample from him and checked a postvoid residual. He is retaining a great deal of urine today.      PAST MEDICAL HISTORY:   Past Medical History:   Diagnosis Date     Anxiety state, unspecified      Other and unspecified hyperlipidemia        PAST SURGICAL HISTORY:   Past Surgical History:   Procedure Laterality Date     APPENDECTOMY      1983       FAMILY HISTORY:   Family History   Problem Relation Age of Onset     Family History Negative No family hx of      Cancer - colorectal No family hx of        SOCIAL HISTORY:   Social History   Substance Use Topics     Smoking status: Never Smoker     Smokeless tobacco: Never Used     Alcohol use Yes      Comment: 4 beers a week       Current Outpatient Prescriptions   Medication     tamsulosin (FLOMAX) 0.4 MG capsule     cefdinir (OMNICEF) 300 MG capsule     Ibuprofen (ADVIL PO)     No current facility-administered medications for this visit.          PHYSICAL EXAM:    There were no vitals taken for this visit.    HEENT: Normocephalic and atraumatic   Cardiac: Not done  Back/Flank: Not  done  CNS/PNS: Not done  Respiratory: Normal non-labored breathing  Abdomen: Soft nontender and nondistended  Peripheral Vascular: Not done  Mental Status: Not done    Penis: Not done  Scrotal Skin: Not done  Testicles: Not done  Epididymis: Not done  Digital Rectal Exam:     Cystoscopy: Not done    Imaging: None    Urinalysis: UA RESULTS:  Recent Labs   Lab Test  12/01/17   1816   COLOR  Yellow   APPEARANCE  Cloudy   URINEGLC  Negative   URINEBILI  Negative   URINEKETONE  Negative   SG  1.025   UBLD  Large*   URINEPH  7.5*   PROTEIN  100*   UROBILINOGEN  0.2   NITRITE  Negative   LEUKEST  Large*   RBCU  25-50*   WBCU  25-50*       PSA: 0.95 in 2015    Post Void Residual: 1700mL    Other labs: None today      IMPRESSION:  Enlarged prostate with urinary retention    PLAN:  After our initial discussion today we placed a Carreno catheter and 1700 mL of clear urine drained from the bladder. Discussed the situation. He is in urinary retention. I added finasteride to his treatment regimen and he will continue taking the Flomax. I recommended that he return to clinic in about 2 weeks so that we can evaluate him with a cystoscopy and do a trial of void as well. He had questions and I talked to him about all the potential scenarios. He understands that he may require surgery in the future or may require long-term catheterization. All of his questions were answered. I will see him back in roughly 2 weeks for PSA and cystoscopy in the office.    Total Time: 30 minutes                                      Total in Consultation: 25 minutes      Biju Che M.D.

## 2017-12-22 NOTE — MR AVS SNAPSHOT
After Visit Summary   12/22/2017    Bill Neri    MRN: [id number]           Patient Information     Date Of Birth          1960        Visit Information        Provider Department      12/22/2017 3:30 PM Biju Che MD [facility]        Today's Diagnoses     Gross hematuria    -  1    Enlarged prostate        Urinary retention           Follow-ups after your visit        Follow-up notes from your care team     Return in about 2 weeks (around 1/5/2018) for PSA, Office cystoscopy.      Your next 10 appointments already scheduled     Santiago 10, 2018 10:45 AM CST   Cystoscopy with Biju Che MD, UB CYF   [facility] ([facility])    [address]  [address]  [facility] [address] [phone]              Future tests that were ordered for you today     Open Future Orders        Priority Expected Expires Ordered    PSA tumor marker Routine  12/22/2018 12/22/2017            Who to contact     If you have questions or need follow up information about today's clinic visit or your schedule please contact [facility] directly at [phone].  Normal or non-critical lab and imaging results will be communicated to you by MyChart, letter or phone within 4 business days after the clinic has received the results. If you do not hear from us within 7 days, please contact the clinic through [employer] or phone. If you have a critical or abnormal lab result, we will notify you by phone as soon as possible.  Submit refill requests through [employer] or call your pharmacy and they will forward the refill request to us. Please allow 3 business days for your refill to be completed.          Additional Information About Your Visit        MyChart Information     [employer] gives you secure access to your electronic health record. If you see  "a primary care provider, you can also send messages to your care team and make appointments. If you have questions, please call your primary care clinic.  If you do not have a primary care provider, please call [phone] and they will assist you.        Care EveryWhere ID     This is your Care EveryWhere ID. This could be used by other organizations to access your [place] medical records  [id number]        Your Vitals Were     Pulse Height Pulse Oximetry BMI (Body Mass Index)          48 1.778 m (5' 10\") 98% 27.26 kg/m2         Blood Pressure from Last 3 Encounters:   12/01/17 110/70   11/11/17 102/60   11/03/16 120/70    Weight from Last 3 Encounters:   12/22/17 86.2 kg (190 lb)   12/01/17 86.2 kg (190 lb)   11/11/17 86.2 kg (190 lb)              We Performed the Following     Bladder scan     UA without Microscopic          Today's Medication Changes          These changes are accurate as of: 12/22/17  4:35 PM.  If you have any questions, ask your nurse or doctor.               Start taking these medicines.        Dose/Directions    finasteride 5 MG tablet   Commonly known as:  PROSCAR   Used for:  Enlarged prostate   Started by:  Biju Che MD        Dose:  5 mg   Take 1 tablet (5 mg) by mouth daily   Quantity:  90 tablet   Refills:  3            Where to get your medicines      These medications were sent to [place] [address] [place] [address] [address]    Hours:  Tech issues with their phone system Phone:  [phone]     finasteride 5 MG tablet                Primary Care Provider Office Phone # Fax #    Jose Ruiz [phone] [address] [facility] [facility] [address]        Equal Access to Services     STEPHIE JULIO : Nacho Segundo, kelby vences, taz silva, tomasz luis. So [facility] [phone].    ATENCIÓN: Si habla español, tiene a clarke disposición " servicios gratuitos de asistencia lingüística. Nanda quinones [phone].    We comply with applicable federal civil rights laws and [place] laws. We do not discriminate on the basis of race, color, national origin, age, disability, sex, sexual orientation, or gender identity.            Thank you!     Thank you for choosing [facility] [place]  for your care. Our goal is always to provide you with excellent care. Hearing back from our patients is one way we can continue to improve our services. Please take a few minutes to complete the written survey that you may receive in the mail after your visit with us. Thank you!             Your Updated Medication List - Protect others around you: Learn how to safely use, store and throw away your medicines at www.disposemymeds.org.          This list is accurate as of: 12/22/17  4:35 PM.  Always use your most recent med list.                   Brand Name Dispense Instructions for use Diagnosis    ADVIL PO      Take 200 mg by mouth as needed for moderate pain        cefdinir 300 MG capsule    OMNICEF    20 capsule    Take 1 capsule (300 mg) by mouth 2 times daily    Acute cystitis with hematuria       finasteride 5 MG tablet    PROSCAR    90 tablet    Take 1 tablet (5 mg) by mouth daily    Enlarged prostate       tamsulosin 0.4 MG capsule    FLOMAX    180 capsule    TAKE 2 CAPSULES (0.8 MG) BY MOUTH DAILY    Benign non-nodular prostatic hyperplasia with lower urinary tract symptoms

## 2017-12-22 NOTE — LETTER
12/22/2017       RE: Bill Neri  [address] [facility] [address]     Dear Colleague,    Thank you for referring your patient, Bill Neri, to the [facility]OLOG[facility] [place] [facility]. Please see a copy of my visit note below.    Office Visit Note  [facility]  [phone]    UROLOGIC DIAGNOSES:   Enlarged prostate    CURRENT INTERVENTIONS:   Flomax    HISTORY:   This is a 57 old gentleman who I saw once in 2015. He has a history of an enlarged prostate and takes Flomax. I saw him because he had been having back pain and was wondering if he possibly had kidney stones. At that time his urinalysis was normal and I ordered a renal ultrasound and PSA. Both of these tests were normal as well. Of note, I reviewed the ultrasound images again today and at the time he had a full bladder but did not appear to have an overly distended bladder. He reports that in the interim he had one urinary tract infection in 2016. More recently he had an episode of painless gross hematuria. He is here for evaluation of the hematuria. He has no other symptoms. He has had no further back pain. Today we collected a urine sample from him and checked a postvoid residual. He is retaining a great deal of urine today.      PAST MEDICAL HISTORY:   Past Medical History:   Diagnosis Date     Anxiety state, unspecified      Other and unspecified hyperlipidemia        PAST SURGICAL HISTORY:   Past Surgical History:   Procedure Laterality Date     APPENDECTOMY      1983       FAMILY HISTORY:   Family History   Problem Relation Age of Onset     Family History Negative No family hx of      Cancer - colorectal No family hx of        SOCIAL HISTORY:   Social History   Substance Use Topics     Smoking status: Never Smoker     Smokeless tobacco: Never Used     Alcohol use Yes      Comment: 4 beers a week       Current Outpatient Prescriptions   Medication      tamsulosin (FLOMAX) 0.4 MG capsule     cefdinir (OMNICEF) 300 MG capsule     Ibuprofen (ADVIL PO)     No current facility-administered medications for this visit.          PHYSICAL EXAM:    There were no vitals taken for this visit.    HEENT: Normocephalic and atraumatic   Cardiac: Not done  Back/Flank: Not done  CNS/PNS: Not done  Respiratory: Normal non-labored breathing  Abdomen: Soft nontender and nondistended  Peripheral Vascular: Not done  Mental Status: Not done    Penis: Not done  Scrotal Skin: Not done  Testicles: Not done  Epididymis: Not done  Digital Rectal Exam:     Cystoscopy: Not done    Imaging: None    Urinalysis: UA RESULTS:  Recent Labs   Lab Test  12/01/17   1816   COLOR  Yellow   APPEARANCE  Cloudy   URINEGLC  Negative   URINEBILI  Negative   URINEKETONE  Negative   SG  1.025   UBLD  Large*   URINEPH  7.5*   PROTEIN  100*   UROBILINOGEN  0.2   NITRITE  Negative   LEUKEST  Large*   RBCU  25-50*   WBCU  25-50*       PSA: 0.95 in 2015    Post Void Residual: 1700mL    Other labs: None today      IMPRESSION:  Enlarged prostate with urinary retention    PLAN:  After our initial discussion today we placed a Carreno catheter and 1700 mL of clear urine drained from the bladder. Discussed the situation. He is in urinary retention. I added finasteride to his treatment regimen and he will continue taking the Flomax. I recommended that he return to clinic in about 2 weeks so that we can evaluate him with a cystoscopy and do a trial of void as well. He had questions and I talked to him about all the potential scenarios. He understands that he may require surgery in the future or may require long-term catheterization. All of his questions were answered. I will see him back in roughly 2 weeks for PSA and cystoscopy in the office.    Total Time: 30 minutes                                      Total in Consultation: 25 minutes      Biju Che M.D.

## 2017-12-22 NOTE — NURSING NOTE
Pt did have gross hema a few weeks ago with pain and freq.  And now is all gone.  Maybe passed a stone?  Pain is now gone and no more gross hem.  CHASTITY Hale, CMA

## 2018-01-08 ENCOUNTER — OFFICE VISIT (OUTPATIENT)
Dept: UROLOGY | Facility: CLINIC | Age: 58
End: 2018-01-08
Payer: COMMERCIAL

## 2018-01-08 VITALS — WEIGHT: 190 LBS | HEIGHT: 70 IN | BODY MASS INDEX: 27.2 KG/M2 | HEART RATE: 56 BPM | OXYGEN SATURATION: 98 %

## 2018-01-08 DIAGNOSIS — R33.9 URINARY RETENTION: ICD-10-CM

## 2018-01-08 DIAGNOSIS — N40.0 ENLARGED PROSTATE: Primary | ICD-10-CM

## 2018-01-08 PROCEDURE — 99214 OFFICE O/P EST MOD 30 MIN: CPT | Mod: 25 | Performed by: UROLOGY

## 2018-01-08 PROCEDURE — 52000 CYSTOURETHROSCOPY: CPT | Performed by: UROLOGY

## 2018-01-08 RX ORDER — CIPROFLOXACIN 500 MG/1
500 TABLET, FILM COATED ORAL ONCE
Qty: 1 TABLET | Refills: 0 | Status: SHIPPED | OUTPATIENT
Start: 2018-01-08 | End: 2018-01-08

## 2018-01-08 ASSESSMENT — PAIN SCALES - GENERAL: PAINLEVEL: NO PAIN (0)

## 2018-01-08 NOTE — PROGRESS NOTES
Office Visit Note  [facility]  [phone]    UROLOGIC DIAGNOSES:   Enlarged prostate, urinary retention    CURRENT INTERVENTIONS:   Flomax, finasteride, Carreno catheter    HISTORY:   Ed returns to clinic today for catheter removal and evaluation with a cystoscopy. He has had the catheter in place for the last 2 weeks. He is taking Flomax and finasteride.       PAST MEDICAL HISTORY:   Past Medical History:   Diagnosis Date     Anxiety state, unspecified      Mumps      Other and unspecified hyperlipidemia        PAST SURGICAL HISTORY:   Past Surgical History:   Procedure Laterality Date     APPENDECTOMY      1983     TESTICLE SURGERY       VASECTOMY         FAMILY HISTORY:   Family History   Problem Relation Age of Onset     Family History Negative No family hx of      Cancer - colorectal No family hx of        SOCIAL HISTORY:   Social History   Substance Use Topics     Smoking status: Never Smoker     Smokeless tobacco: Never Used     Alcohol use Yes      Comment: 4 beers a week       Current Outpatient Prescriptions   Medication     finasteride (PROSCAR) 5 MG tablet     tamsulosin (FLOMAX) 0.4 MG capsule     cefdinir (OMNICEF) 300 MG capsule     Ibuprofen (ADVIL PO)     No current facility-administered medications for this visit.          PHYSICAL EXAM:    There were no vitals taken for this visit.    HEENT: Normocephalic and atraumatic   Cardiac: Not done  Back/Flank: Not done  CNS/PNS: Not done  Respiratory: Normal non-labored breathing  Abdomen: Soft nontender and nondistended  Peripheral Vascular: Not done  Mental Status: Not done    Penis: Not done  Scrotal Skin: Not done  Testicles: Not done  Epididymis: Not done  Digital Rectal Exam:     Cystoscopy: I performed flexible cystoscopy today. The patient had a high capacity bladder but otherwise the bladder is normal throughout. A few small areas of edema from the catheter. In examination of the prostate and retroflexion of the scope he only had  moderate obstruction from lateral lobes of the prostate. I filled the bladder and noticed a lot of water and he had a strong urge to urinate. I removed scope and he was unable to urinate    Imaging: None    Urinalysis: UA RESULTS:  Recent Labs   Lab Test  12/22/17   1559  12/01/17   1816   COLOR  Yellow  Yellow   APPEARANCE  Clear  Cloudy   URINEGLC  Negative  Negative   URINEBILI  Negative  Negative   URINEKETONE  Negative  Negative   SG  1.025  1.025   UBLD  Trace*  Large*   URINEPH  5.5  7.5*   PROTEIN  Negative  100*   UROBILINOGEN  0.2  0.2   NITRITE  Negative  Negative   LEUKEST  Trace*  Large*   RBCU   --   25-50*   WBCU   --   25-50*       PSA:     Post Void Residual:     Other labs: None today      IMPRESSION:  Urinary retention    PLAN:  Unfortunately, he has continued urinary retention today. He reported an urge to urinate when I filled the bladder but as soon as I removed the scope the urge subsided. The prostate did not appear terribly enlarged, perhaps moderately obstructing, on cystoscopy today so the cause of his retention is uncertain. I recommended 2 things today: First recommended we teach him self intermittent catheterization and that we have him do so 4 times daily. He agreed to the plan. The nurse taught him how to self catheterize. He needs to use a coudé catheter. Second, I recommended urodynamics to get a sense of the bladder function. I explained the procedure in detail to him and he agrees as well. We will get him set up for urodynamics and he will self catheterize in the meantime. I will see him back for the results of urodynamic study. He will continue the flomax and finasteride as well. Also checking PSA the day of his urodynamics    Total Time: 30 minutes                                      Total in Consultation: 25 minutes    Biju Che M.D.

## 2018-01-08 NOTE — PATIENT INSTRUCTIONS
"     AFTER YOUR CYSTOSCOPY         You have just completed a cystoscopy, or \"cysto\", which allowed your physician to learn more about your bladder (or to remove a stent placed after surgery). We suggest that you continue to avoid caffeine, fruit juice, and alcohol for the next 24 hours, however, you are encouraged to return to your normal activities.       A few things that are considered normal after your cystoscopy:    * small amount of bleeding (or spotting) that clears within the next 24 hours    * slight burning sensation with urination    * sensation to of needing to avoid more frequently    * the feeling of \"air\" in your urine    * mild discomfort that is relieved with Tylonol        Please contact our office promptly if you:    * develop a fever above 101 degrees    * are unable to urinate    * develop bright red blood that does not stop    * severe pain or swelling        And of course, please contact our office with any concerns or questions [phone]        "

## 2018-01-08 NOTE — LETTER
1/8/2018       RE: Bill Neri  [address] [facility] [address]     Dear Colleague,    Thank you for referring your patient, Bill Neri, to the [facility]OLOG[facility] [place] [facility]. Please see a copy of my visit note below.    Office Visit Note  [facility]  [phone]    UROLOGIC DIAGNOSES:   Enlarged prostate, urinary retention    CURRENT INTERVENTIONS:   Flomax, finasteride, Carreno catheter    HISTORY:   Ed returns to clinic today for catheter removal and evaluation with a cystoscopy. He has had the catheter in place for the last 2 weeks. He is taking Flomax and finasteride.       PAST MEDICAL HISTORY:   Past Medical History:   Diagnosis Date     Anxiety state, unspecified      Mumps      Other and unspecified hyperlipidemia        PAST SURGICAL HISTORY:   Past Surgical History:   Procedure Laterality Date     APPENDECTOMY      1983     TESTICLE SURGERY       VASECTOMY         FAMILY HISTORY:   Family History   Problem Relation Age of Onset     Family History Negative No family hx of      Cancer - colorectal No family hx of        SOCIAL HISTORY:   Social History   Substance Use Topics     Smoking status: Never Smoker     Smokeless tobacco: Never Used     Alcohol use Yes      Comment: 4 beers a week       Current Outpatient Prescriptions   Medication     finasteride (PROSCAR) 5 MG tablet     tamsulosin (FLOMAX) 0.4 MG capsule     cefdinir (OMNICEF) 300 MG capsule     Ibuprofen (ADVIL PO)     No current facility-administered medications for this visit.          PHYSICAL EXAM:    There were no vitals taken for this visit.    HEENT: Normocephalic and atraumatic   Cardiac: Not done  Back/Flank: Not done  CNS/PNS: Not done  Respiratory: Normal non-labored breathing  Abdomen: Soft nontender and nondistended  Peripheral Vascular: Not done  Mental Status: Not done    Penis: Not done  Scrotal Skin: Not done  Testicles: Not  done  Epididymis: Not done  Digital Rectal Exam:     Cystoscopy: I performed flexible cystoscopy today. The patient had a high capacity bladder but otherwise the bladder is normal throughout. A few small areas of edema from the catheter. In examination of the prostate and retroflexion of the scope he only had moderate obstruction from lateral lobes of the prostate. I filled the bladder and noticed a lot of water and he had a strong urge to urinate. I removed scope and he was unable to urinate    Imaging: None    Urinalysis: UA RESULTS:  Recent Labs   Lab Test  12/22/17   1559  12/01/17   1816   COLOR  Yellow  Yellow   APPEARANCE  Clear  Cloudy   URINEGLC  Negative  Negative   URINEBILI  Negative  Negative   URINEKETONE  Negative  Negative   SG  1.025  1.025   UBLD  Trace*  Large*   URINEPH  5.5  7.5*   PROTEIN  Negative  100*   UROBILINOGEN  0.2  0.2   NITRITE  Negative  Negative   LEUKEST  Trace*  Large*   RBCU   --   25-50*   WBCU   --   25-50*       PSA:     Post Void Residual:     Other labs: None today      IMPRESSION:  Urinary retention    PLAN:  Unfortunately, he has continued urinary retention today. He reported an urge to urinate when I filled the bladder but as soon as I removed the scope the urge subsided. The prostate did not appear terribly enlarged, perhaps moderately obstructing, on cystoscopy today so the cause of his retention is uncertain. I recommended 2 things today: First recommended we teach him self intermittent catheterization and that we have him do so 4 times daily. He agreed to the plan. The nurse taught him how to self catheterize. He needs to use a coudé catheter. Second, I recommended urodynamics to get a sense of the bladder function. I explained the procedure in detail to him and he agrees as well. We will get him set up for urodynamics and he will self catheterize in the meantime. I will see him back for the results of urodynamic study. He will continue the flomax and finasteride as  well. Also checking PSA the day of his urodynamics    Total Time: 30 minutes                                      Total in Consultation: 25 minutes    Biju Che M.D.

## 2018-01-08 NOTE — MR AVS SNAPSHOT
"              After Visit Summary   1/8/2018    Bill Neri    MRN: [id number]           Patient Information     Date Of Birth          1960        Visit Information        Provider Department      1/8/2018 10:45 AM Biju Che MD; UB CYF [facility] [place]        Today's Diagnoses     BPH (benign prostatic hyperplasia)    -  1      Care Instructions         AFTER YOUR CYSTOSCOPY         You have just completed a cystoscopy, or \"cysto\", which allowed your physician to learn more about your bladder (or to remove a stent placed after surgery). We suggest that you continue to avoid caffeine, fruit juice, and alcohol for the next 24 hours, however, you are encouraged to return to your normal activities.       A few things that are considered normal after your cystoscopy:    * small amount of bleeding (or spotting) that clears within the next 24 hours    * slight burning sensation with urination    * sensation to of needing to avoid more frequently    * the feeling of \"air\" in your urine    * mild discomfort that is relieved with Tylonol        Please contact our office promptly if you:    * develop a fever above 101 degrees    * are unable to urinate    * develop bright red blood that does not stop    * severe pain or swelling        And of course, please contact our office with any concerns or questions [phone]                Follow-ups after your visit        Who to contact     If you have questions or need follow up information about today's clinic visit or your schedule please contact [facility] [place] directly at [phone].  Normal or non-critical lab and imaging results will be communicated to you by MyChart, letter or phone within 4 business days after the clinic has received the results. If you do not hear from us within 7 days, please contact the clinic through MyChart or phone. If you have a critical or " "abnormal lab result, we will notify you by phone as soon as possible.  Submit refill requests through [employer] - innovate | mentor | invest or call your pharmacy and they will forward the refill request to us. Please allow 3 business days for your refill to be completed.          Additional Information About Your Visit        [employer] Information     [employer] - innovate | mentor | invest gives you secure access to your electronic health record. If you see a primary care provider, you can also send messages to your care team and make appointments. If you have questions, please call your primary care clinic.  If you do not have a primary care provider, please call [phone] and they will assist you.        Care EveryWhere ID     This is your Care EveryWhere ID. This could be used by other organizations to access your [place] medical records  [id number]        Your Vitals Were     Pulse Height Pulse Oximetry BMI (Body Mass Index)          56 1.778 m (5' 10\") 98% 27.26 kg/m2         Blood Pressure from Last 3 Encounters:   12/01/17 110/70   11/11/17 102/60   11/03/16 120/70    Weight from Last 3 Encounters:   01/08/18 86.2 kg (190 lb)   12/22/17 86.2 kg (190 lb)   12/01/17 86.2 kg (190 lb)              Today, you had the following     No orders found for display         Today's Medication Changes          These changes are accurate as of: 1/8/18 11:15 AM.  If you have any questions, ask your nurse or doctor.               Start taking these medicines.        Dose/Directions    ciprofloxacin 500 MG tablet   Commonly known as:  CIPRO   Used for:  BPH (benign prostatic hyperplasia)   Started by:  Biju Che MD        Dose:  500 mg   Take 1 tablet (500 mg) by mouth once for 1 dose   Quantity:  1 tablet   Refills:  0            Where to get your medicines      These medications were sent to [address] [address] [facility]  [address] [facility] [address]    Hours:  Tech issues with their phone system Phone:  [phone]     " ciprofloxacin 500 MG tablet                Primary Care Provider Office Phone # Fax #    Jose Ruiz [phone] [address]  [facility] [address]        Equal Access to Services     STEPHIE JULIO : Nacho crawford marceloo Somelizaali, waaxda luqadaha, qaybta kaalmada adeegyada, tomasz flores laAriaslionel luis. So [facility] [phone].    ATENCIÓN: Si habla español, tiene a clarke disposición servicios gratuitos de asistencia lingüística. Llame al [phone].    We comply with applicable federal civil rights laws and [place] laws. We do not discriminate on the basis of race, color, national origin, age, disability, sex, sexual orientation, or gender identity.            Thank you!     Thank you for choosing [facility] [place]  for your care. Our goal is always to provide you with excellent care. Hearing back from our patients is one way we can continue to improve our services. Please take a few minutes to complete the written survey that you may receive in the mail after your visit with us. Thank you!             Your Updated Medication List - Protect others around you: Learn how to safely use, store and throw away your medicines at www.disposemymeds.org.          This list is accurate as of: 1/8/18 11:15 AM.  Always use your most recent med list.                   Brand Name Dispense Instructions for use Diagnosis    ADVIL PO      Take 200 mg by mouth as needed for moderate pain        cefdinir 300 MG capsule    OMNICEF    20 capsule    Take 1 capsule (300 mg) by mouth 2 times daily    Acute cystitis with hematuria       ciprofloxacin 500 MG tablet    CIPRO    1 tablet    Take 1 tablet (500 mg) by mouth once for 1 dose    BPH (benign prostatic hyperplasia)       finasteride 5 MG tablet    PROSCAR    90 tablet    Take 1 tablet (5 mg) by mouth daily    Enlarged prostate       tamsulosin 0.4 MG capsule    FLOMAX    180 capsule    TAKE 2 CAPSULES  (0.8 MG) BY MOUTH DAILY    Benign non-nodular prostatic hyperplasia with lower urinary tract symptoms

## 2018-01-12 ENCOUNTER — TELEPHONE (OUTPATIENT)
Dept: UROLOGY | Facility: CLINIC | Age: 58
End: 2018-01-12

## 2018-01-12 NOTE — TELEPHONE ENCOUNTER
Returning patient's phone call.  He noticed he still had Omnicef on his medication list which he no longer takes.  I opened an encounter to remove the med from his list.  Iliana Lord LPN

## 2018-01-26 ENCOUNTER — OFFICE VISIT (OUTPATIENT)
Dept: UROLOGY | Facility: CLINIC | Age: 58
End: 2018-01-26
Payer: COMMERCIAL

## 2018-01-26 DIAGNOSIS — N40.0 ENLARGED PROSTATE: ICD-10-CM

## 2018-01-26 DIAGNOSIS — N40.1 BENIGN PROSTATIC HYPERPLASIA WITH URINARY RETENTION: Primary | ICD-10-CM

## 2018-01-26 DIAGNOSIS — R33.9 URINARY RETENTION: Primary | ICD-10-CM

## 2018-01-26 DIAGNOSIS — R33.9 URINARY RETENTION: ICD-10-CM

## 2018-01-26 DIAGNOSIS — R33.8 BENIGN PROSTATIC HYPERPLASIA WITH URINARY RETENTION: Primary | ICD-10-CM

## 2018-01-26 LAB
ALBUMIN UR-MCNC: ABNORMAL MG/DL
APPEARANCE UR: CLEAR
BILIRUB UR QL STRIP: NEGATIVE
COLOR UR AUTO: YELLOW
GLUCOSE UR STRIP-MCNC: NEGATIVE MG/DL
HGB UR QL STRIP: NEGATIVE
KETONES UR STRIP-MCNC: NEGATIVE MG/DL
LEUKOCYTE ESTERASE UR QL STRIP: NEGATIVE
NITRATE UR QL: NEGATIVE
PH UR STRIP: 5 PH (ref 5–7)
PSA SERPL-MCNC: 0.27 NG/ML (ref 0–4)
SOURCE: ABNORMAL
SP GR UR STRIP: 1.02 (ref 1–1.03)
UROBILINOGEN UR STRIP-ACNC: 0.2 EU/DL (ref 0.2–1)

## 2018-01-26 PROCEDURE — 84153 ASSAY OF PSA TOTAL: CPT | Performed by: UROLOGY

## 2018-01-26 PROCEDURE — 51797 INTRAABDOMINAL PRESSURE TEST: CPT | Mod: TC | Performed by: UROLOGY

## 2018-01-26 PROCEDURE — 51798 US URINE CAPACITY MEASURE: CPT | Performed by: UROLOGY

## 2018-01-26 PROCEDURE — 81003 URINALYSIS AUTO W/O SCOPE: CPT | Performed by: UROLOGY

## 2018-01-26 PROCEDURE — 51784 ANAL/URINARY MUSCLE STUDY: CPT | Mod: TC | Performed by: UROLOGY

## 2018-01-26 PROCEDURE — 51728 CYSTOMETROGRAM W/VP: CPT | Mod: TC | Performed by: UROLOGY

## 2018-01-26 PROCEDURE — 51741 ELECTRO-UROFLOWMETRY FIRST: CPT | Mod: TC | Performed by: UROLOGY

## 2018-01-26 RX ORDER — CIPROFLOXACIN 500 MG/1
500 TABLET, FILM COATED ORAL 2 TIMES DAILY
Qty: 2 TABLET | Refills: 0 | Status: SHIPPED | OUTPATIENT
Start: 2018-01-26 | End: 2018-03-01

## 2018-01-26 NOTE — PROGRESS NOTES
Bill Neri comes into clinic today at the request of Dr Che Ordering Provider for urodynamic testing.          This service provided today was under the supervising provider of the day Dr Ward, who was available if needed.      Testing done see separate report. HAYDEN del rio did  have a very strong urge to void @ 627 but was unable to void. HAYDEN dent take one Cipro this am and one tonight. He will see Dr Che  On Monday for these results.     Shruthi Gee LPN

## 2018-01-26 NOTE — MR AVS SNAPSHOT
After Visit Summary   1/26/2018    Bill Neri    MRN: [id number]           Patient Information     Date Of Birth          1960        Visit Information        Provider Department      1/26/2018 9:00 AM UA NURSE [facility] Urology [facility]        Today's Diagnoses     Urinary retention        Enlarged prostate           Follow-ups after your visit        Your next 10 appointments already scheduled     Jan 29, 2018 10:15 AM CST   Return Visit with Biju Che MD   [facility] [place]    [address]  [address]  [facility] [address] [phone]              Who to contact     If you have questions or need follow up information about today's clinic visit or your schedule please contact [facility] [place] directly at [phone].  Normal or non-critical lab and imaging results will be communicated to you by [employer], letter or phone within 4 business days after the clinic has received the results. If you do not hear from us within 7 days, please contact the clinic through [employer] or phone. If you have a critical or abnormal lab result, we will notify you by phone as soon as possible.  Submit refill requests through [email or web address] or call your pharmacy and they will forward the refill request to us. Please allow 3 business days for your refill to be completed.          Additional Information About Your Visit        MyChart Information     [email or web address] gives you secure access to your electronic health record. If you see a primary care provider, you can also send messages to your care team and make appointments. If you have questions, please call your primary care clinic.  If you do not have a primary care provider, please call [phone] and they will assist you.        Care EveryWhere ID     This is your Care EveryWhere ID. This could be used by other  organizations to access your [place] medical records  [id number]         Blood Pressure from Last 3 Encounters:   12/01/17 110/70   11/11/17 102/60   11/03/16 120/70    Weight from Last 3 Encounters:   01/08/18 86.2 kg (190 lb)   12/22/17 86.2 kg (190 lb)   12/01/17 86.2 kg (190 lb)              We Performed the Following     CYSTOMETROGRAM COMPLEX W VOIDING PRESS PROFILE (03605)     EMG ANAL/URETH SPHINCTER, OTHER THAN NEEDLE (39740)     UA without Microscopic     VOIDING PRESSURE STUDY, INTRA-ABDOMINAL (05839)          Today's Medication Changes          These changes are accurate as of 1/26/18 10:35 AM.  If you have any questions, ask your nurse or doctor.               Start taking these medicines.        Dose/Directions    ciprofloxacin 500 MG tablet   Commonly known as:  CIPRO   Used for:  Urinary retention, Enlarged prostate        Dose:  500 mg   Take 1 tablet (500 mg) by mouth 2 times daily   Quantity:  2 tablet   Refills:  0            Where to get your medicines      These medications were sent to [place] [id number]    Hours:  Tech issues with their phone system Phone:  [phone]     ciprofloxacin 500 MG tablet                Primary Care Provider Office Phone # Fax #    Jose Ruiz [phone] [address] [facility] [address]        Equal Access to Services     STEPHIE JULIO AH: Hadsihva quarles Soheather, waaxda luqadaha, qaybta kaalmatomasz rutledge. So [facility] [phone].    ATENCIÓN: Si habla español, tiene a clarke disposición servicios gratuitos de asistencia lingüística. Nanda al [phone].    We comply with applicable federal civil rights laws and [place] laws. We do not discriminate on the basis of race, color, national origin, age, disability, sex, sexual orientation, or gender identity.            Thank you!     Thank you for choosing  [facility]OLOG[facility] CLIN[facility] [place]  for your care. Our goal is always to provide you with excellent care. Hearing back from our patients is one way we can continue to improve our services. Please take a few minutes to complete the written survey that you may receive in the mail after your visit with us. Thank you!             Your Updated Medication List - Protect others around you: Learn how to safely use, store and throw away your medicines at www.disposemymeds.org.          This list is accurate as of 1/26/18 10:35 AM.  Always use your most recent med list.                   Brand Name Dispense Instructions for use Diagnosis    ADVIL PO      Take 200 mg by mouth as needed for moderate pain        ciprofloxacin 500 MG tablet    CIPRO    2 tablet    Take 1 tablet (500 mg) by mouth 2 times daily    Urinary retention, Enlarged prostate       finasteride 5 MG tablet    PROSCAR    90 tablet    Take 1 tablet (5 mg) by mouth daily    Enlarged prostate       tamsulosin 0.4 MG capsule    FLOMAX    180 capsule    TAKE 2 CAPSULES (0.8 MG) BY MOUTH DAILY    Benign non-nodular prostatic hyperplasia with lower urinary tract symptoms

## 2018-01-29 ENCOUNTER — OFFICE VISIT (OUTPATIENT)
Dept: UROLOGY | Facility: CLINIC | Age: 58
End: 2018-01-29
Payer: COMMERCIAL

## 2018-01-29 VITALS — WEIGHT: 190 LBS | OXYGEN SATURATION: 99 % | HEIGHT: 70 IN | BODY MASS INDEX: 27.2 KG/M2 | HEART RATE: 56 BPM

## 2018-01-29 DIAGNOSIS — N40.0 ENLARGED PROSTATE: ICD-10-CM

## 2018-01-29 DIAGNOSIS — N40.0 ENLARGED PROSTATE: Primary | ICD-10-CM

## 2018-01-29 DIAGNOSIS — R33.9 URINARY RETENTION: ICD-10-CM

## 2018-01-29 PROCEDURE — 80048 BASIC METABOLIC PNL TOTAL CA: CPT | Performed by: UROLOGY

## 2018-01-29 PROCEDURE — 36415 COLL VENOUS BLD VENIPUNCTURE: CPT | Performed by: UROLOGY

## 2018-01-29 PROCEDURE — 99214 OFFICE O/P EST MOD 30 MIN: CPT | Performed by: UROLOGY

## 2018-01-29 ASSESSMENT — PAIN SCALES - GENERAL: PAINLEVEL: NO PAIN (0)

## 2018-01-29 NOTE — LETTER
1/29/2018       RE: Bill Neri  [address] [facility] [address]     Dear Colleague,    Thank you for referring your patient, Bill Neri, to the [facility] [place] at [facility]. Please see a copy of my visit note below.    Office Visit Note  [facility]  [phone]    UROLOGIC DIAGNOSES:   Enlarged prostate, urinary retention    CURRENT INTERVENTIONS:   Flomax, finasteride, self-catheterization    HISTORY:   Bill returns to clinic today for follow-up on urinary retention. He has been self catheterizing 4 times daily. He has had some slight urges to urinate in between catheterizations but he has not been urinating in between catheterizations at all. He had urodynamics performed and the bladder showed no activity or detrusor function. He was filled with 625 mL of water and he did have urge to urinate but he did not urinate. He reports no difficulty with catheterizing. His PSA is very low.      PAST MEDICAL HISTORY:   Past Medical History:   Diagnosis Date     Anxiety state, unspecified      Mumps      Other and unspecified hyperlipidemia        PAST SURGICAL HISTORY:   Past Surgical History:   Procedure Laterality Date     APPENDECTOMY      1983     TESTICLE SURGERY       VASECTOMY         FAMILY HISTORY:   Family History   Problem Relation Age of Onset     Family History Negative No family hx of      Cancer - colorectal No family hx of        SOCIAL HISTORY:   Social History   Substance Use Topics     Smoking status: Never Smoker     Smokeless tobacco: Never Used     Alcohol use Yes      Comment: 4 beers a week       Current Outpatient Prescriptions   Medication     ciprofloxacin (CIPRO) 500 MG tablet     finasteride (PROSCAR) 5 MG tablet     tamsulosin (FLOMAX) 0.4 MG capsule     Ibuprofen (ADVIL PO)     No current facility-administered medications for this visit.          PHYSICAL EXAM:    There were no  vitals taken for this visit.    HEENT: Normocephalic and atraumatic   Cardiac: Not done  Back/Flank: Not done  CNS/PNS: Not done  Respiratory: Normal non-labored breathing  Abdomen: Soft nontender and nondistended  Peripheral Vascular: Not done  Mental Status: Not done    Penis: Not done  Scrotal Skin: Not done  Testicles: Not done  Epididymis: Not done  Digital Rectal Exam:     Cystoscopy: Not done    Imaging: None    Urinalysis: UA RESULTS:  Recent Labs   Lab Test  01/26/18   0907   12/01/17   1816   COLOR  Yellow   < >  Yellow   APPEARANCE  Clear   < >  Cloudy   URINEGLC  Negative   < >  Negative   URINEBILI  Negative   < >  Negative   URINEKETONE  Negative   < >  Negative   SG  1.025   < >  1.025   UBLD  Negative   < >  Large*   URINEPH  5.0   < >  7.5*   PROTEIN  Trace*   < >  100*   UROBILINOGEN  0.2   < >  0.2   NITRITE  Negative   < >  Negative   LEUKEST  Negative   < >  Large*   RBCU   --    --   25-50*   WBCU   --    --   25-50*    < > = values in this interval not displayed.       PSA: 0.27    Post Void Residual:     Other labs: None today      IMPRESSION:  Urinary retention    PLAN:  He continues to have urinary retention. Initially, he had a very distended bladder before a catheter was placed. The bladder shows no recovery of function. It may show some recovery of function in the future. We discussed his options. He could proceed with surgery and we did discuss this in detail today, but with the lack of bladder function right now I would not be optimistic that surgery would get him urinating normally at this point. He can also continue with self-catheterization and wait and see if the bladder regains some function. He was having no difficulty with catheterizing so I would recommend this option. All of his questions and his wife's questions were answered. For now he will continue the Flomax and finasteride and self-catheterization and I will see him back in 3 months for follow-up. We might repeat  urodynamics studies in the future if we have some inkling that the bladder is getting some function back.    Total Time: 30 minutes                                      Total in Consultation: 30 minutes      Biju Che M.D.

## 2018-01-29 NOTE — PROGRESS NOTES
Office Visit Note  [facility]  [phone]    UROLOGIC DIAGNOSES:   Enlarged prostate, urinary retention    CURRENT INTERVENTIONS:   Flomax, finasteride, self-catheterization    HISTORY:   Bill returns to clinic today for follow-up on urinary retention. He has been self catheterizing 4 times daily. He has had some slight urges to urinate in between catheterizations but he has not been urinating in between catheterizations at all. He had urodynamics performed and the bladder showed no activity or detrusor function. He was filled with 625 mL of water and he did have urge to urinate but he did not urinate. He reports no difficulty with catheterizing. His PSA is very low.      PAST MEDICAL HISTORY:   Past Medical History:   Diagnosis Date     Anxiety state, unspecified      Mumps      Other and unspecified hyperlipidemia        PAST SURGICAL HISTORY:   Past Surgical History:   Procedure Laterality Date     APPENDECTOMY      1983     TESTICLE SURGERY       VASECTOMY         FAMILY HISTORY:   Family History   Problem Relation Age of Onset     Family History Negative No family hx of      Cancer - colorectal No family hx of        SOCIAL HISTORY:   Social History   Substance Use Topics     Smoking status: Never Smoker     Smokeless tobacco: Never Used     Alcohol use Yes      Comment: 4 beers a week       Current Outpatient Prescriptions   Medication     ciprofloxacin (CIPRO) 500 MG tablet     finasteride (PROSCAR) 5 MG tablet     tamsulosin (FLOMAX) 0.4 MG capsule     Ibuprofen (ADVIL PO)     No current facility-administered medications for this visit.          PHYSICAL EXAM:    There were no vitals taken for this visit.    HEENT: Normocephalic and atraumatic   Cardiac: Not done  Back/Flank: Not done  CNS/PNS: Not done  Respiratory: Normal non-labored breathing  Abdomen: Soft nontender and nondistended  Peripheral Vascular: Not done  Mental Status: Not done    Penis: Not done  Scrotal Skin: Not  done  Testicles: Not done  Epididymis: Not done  Digital Rectal Exam:     Cystoscopy: Not done    Imaging: None    Urinalysis: UA RESULTS:  Recent Labs   Lab Test  01/26/18   0907   12/01/17   1816   COLOR  Yellow   < >  Yellow   APPEARANCE  Clear   < >  Cloudy   URINEGLC  Negative   < >  Negative   URINEBILI  Negative   < >  Negative   URINEKETONE  Negative   < >  Negative   SG  1.025   < >  1.025   UBLD  Negative   < >  Large*   URINEPH  5.0   < >  7.5*   PROTEIN  Trace*   < >  100*   UROBILINOGEN  0.2   < >  0.2   NITRITE  Negative   < >  Negative   LEUKEST  Negative   < >  Large*   RBCU   --    --   25-50*   WBCU   --    --   25-50*    < > = values in this interval not displayed.       PSA: 0.27    Post Void Residual:     Other labs: None today      IMPRESSION:  Urinary retention    PLAN:  He continues to have urinary retention. Initially, he had a very distended bladder before a catheter was placed. The bladder shows no recovery of function. It may show some recovery of function in the future. We discussed his options. He could proceed with surgery and we did discuss this in detail today, but with the lack of bladder function right now I would not be optimistic that surgery would get him urinating normally at this point. He can also continue with self-catheterization and wait and see if the bladder regains some function. He was having no difficulty with catheterizing so I would recommend this option. All of his questions and his wife's questions were answered. For now he will continue the Flomax and finasteride and self-catheterization and I will see him back in 3 months for follow-up. We might repeat urodynamics studies in the future if we have some inkling that the bladder is getting some function back.    Total Time: 30 minutes                                      Total in Consultation: 30 minutes      Biju Che M.D.

## 2018-01-29 NOTE — MR AVS SNAPSHOT
After Visit Summary   1/29/2018    Bill Neri    MRN: [id number]           Patient Information     Date Of Birth          1960        Visit Information        Provider Department      1/29/2018 10:15 AM Biju Che MD [facility]        Today's Diagnoses     Enlarged prostate    -  1    Urinary retention           Follow-ups after your visit        Follow-up notes from your care team     Return in about 3 months (around 4/29/2018) for UA and bladder scan.      Your next 10 appointments already scheduled     Jan 29, 2018 11:00 AM CST   LAB with RI LAB   [facility] ([facility])    [address]  [facility] [address]   [phone]           Please do not eat 10-12 hours before your appointment if you are coming in fasting for labs on lipids, cholesterol, or glucose (sugar). This does not apply to pregnant women. Water, hot tea and black coffee (with nothing added) are okay. Do not drink other fluids, diet soda or chew gum.            Apr 30, 2018  4:00 PM CDT   Return Visit with Biju Che MD   [facility] ([facility])    [address]  [address]  [facility] [id number] [phone]              Future tests that were ordered for you today     Open Future Orders        Priority Expected Expires Ordered    **Basic metabolic panel FUTURE anytime Routine 1/29/2018 1/29/2019 1/29/2018            Who to contact     If you have questions or need follow up information about today's clinic visit or your schedule please contact [facility] directly at [phone].  Normal or non-critical lab and imaging results will be communicated to you by MyChart, letter or phone within 4 business days after the clinic has received the results. If you do not hear from us within 7  "days, please contact the clinic through [employer] or phone. If you have a critical or abnormal lab result, we will notify you by phone as soon as possible.  Submit refill requests through [employer] or call your pharmacy and they will forward the refill request to us. Please allow 3 business days for your refill to be completed.          Additional Information About Your Visit        "[employer]hart Information     [employer] gives you secure access to your electronic health record. If you see a primary care provider, you can also send messages to your care team and make appointments. If you have questions, please call your primary care clinic.  If you do not have a primary care provider, please call [phone] and they will assist you.        Care EveryWhere ID     This is your Care EveryWhere ID. This could be used by other organizations to access your [place] medical records  [id number]        Your Vitals Were     Pulse Height Pulse Oximetry BMI (Body Mass Index)          56 1.778 m (5' 10\") 99% 27.26 kg/m2         Blood Pressure from Last 3 Encounters:   12/01/17 110/70   11/11/17 102/60   11/03/16 120/70    Weight from Last 3 Encounters:   01/29/18 86.2 kg (190 lb)   01/08/18 86.2 kg (190 lb)   12/22/17 86.2 kg (190 lb)               Primary Care Provider Office Phone # Fax #    Jose Ruiz [phone] [address] [facility] [address]        Equal Access to Services     STEPHIE JULIO AH: Hadii aad ku hadasho Soomaali, waaxda luqadaha, qaybta kaalmada adeegyada, tomasz payne . So [facility] [phone].    ATENCIÓN: Si habla español, tiene a clarke disposición servicios gratuitos de asistencia lingüística. Llbecca al [phone].    We comply with applicable federal civil rights laws and [place] laws. We do not discriminate on the basis of race, color, national origin, age, disability, sex, sexual orientation, or gender identity.            Thank you!     Thank you for " choosing [facility] [place]  for your care. Our goal is always to provide you with excellent care. Hearing back from our patients is one way we can continue to improve our services. Please take a few minutes to complete the written survey that you may receive in the mail after your visit with us. Thank you!             Your Updated Medication List - Protect others around you: Learn how to safely use, store and throw away your medicines at www.disposemymeds.org.          This list is accurate as of 1/29/18 10:50 AM.  Always use your most recent med list.                   Brand Name Dispense Instructions for use Diagnosis    ADVIL PO      Take 200 mg by mouth as needed for moderate pain        ciprofloxacin 500 MG tablet    CIPRO    2 tablet    Take 1 tablet (500 mg) by mouth 2 times daily    Urinary retention, Enlarged prostate       finasteride 5 MG tablet    PROSCAR    90 tablet    Take 1 tablet (5 mg) by mouth daily    Enlarged prostate       tamsulosin 0.4 MG capsule    FLOMAX    180 capsule    TAKE 2 CAPSULES (0.8 MG) BY MOUTH DAILY    Benign non-nodular prostatic hyperplasia with lower urinary tract symptoms

## 2018-01-30 LAB
ANION GAP SERPL CALCULATED.3IONS-SCNC: 4 MMOL/L (ref 3–14)
BUN SERPL-MCNC: 17 MG/DL (ref 7–30)
CALCIUM SERPL-MCNC: 9.2 MG/DL (ref 8.5–10.1)
CHLORIDE SERPL-SCNC: 107 MMOL/L (ref 94–109)
CO2 SERPL-SCNC: 30 MMOL/L (ref 20–32)
CREAT SERPL-MCNC: 1.01 MG/DL (ref 0.66–1.25)
GFR SERPL CREATININE-BSD FRML MDRD: 76 ML/MIN/1.7M2
GLUCOSE SERPL-MCNC: 80 MG/DL (ref 70–99)
POTASSIUM SERPL-SCNC: 4.4 MMOL/L (ref 3.4–5.3)
SODIUM SERPL-SCNC: 141 MMOL/L (ref 133–144)

## 2018-02-27 ENCOUNTER — TELEPHONE (OUTPATIENT)
Dept: UROLOGY | Facility: CLINIC | Age: 58
End: 2018-02-27

## 2018-02-27 NOTE — TELEPHONE ENCOUNTER
Patient called nurse line and LM. Returned patient's phone call and LM. Will wait for a return phone call.    Aye Sánchez LPN

## 2018-03-01 ENCOUNTER — OFFICE VISIT (OUTPATIENT)
Dept: FAMILY MEDICINE | Facility: CLINIC | Age: 58
End: 2018-03-01
Payer: COMMERCIAL

## 2018-03-01 VITALS
BODY MASS INDEX: 29.72 KG/M2 | DIASTOLIC BLOOD PRESSURE: 66 MMHG | HEIGHT: 70 IN | WEIGHT: 207.6 LBS | TEMPERATURE: 97.7 F | HEART RATE: 77 BPM | OXYGEN SATURATION: 99 % | SYSTOLIC BLOOD PRESSURE: 106 MMHG

## 2018-03-01 DIAGNOSIS — M54.41 CHRONIC RIGHT-SIDED LOW BACK PAIN WITH RIGHT-SIDED SCIATICA: ICD-10-CM

## 2018-03-01 DIAGNOSIS — R33.9 URINARY RETENTION WITH INCOMPLETE BLADDER EMPTYING: Primary | ICD-10-CM

## 2018-03-01 DIAGNOSIS — R20.0 LEG NUMBNESS: ICD-10-CM

## 2018-03-01 DIAGNOSIS — G89.29 CHRONIC RIGHT-SIDED LOW BACK PAIN WITH RIGHT-SIDED SCIATICA: ICD-10-CM

## 2018-03-01 LAB
ERYTHROCYTE [DISTWIDTH] IN BLOOD BY AUTOMATED COUNT: 14.9 % (ref 10–15)
ERYTHROCYTE [SEDIMENTATION RATE] IN BLOOD BY WESTERGREN METHOD: 9 MM/H (ref 0–20)
HCT VFR BLD AUTO: 38.7 % (ref 40–53)
HGB BLD-MCNC: 12.1 G/DL (ref 13.3–17.7)
MCH RBC QN AUTO: 26.8 PG (ref 26.5–33)
MCHC RBC AUTO-ENTMCNC: 31.3 G/DL (ref 31.5–36.5)
MCV RBC AUTO: 86 FL (ref 78–100)
PLATELET # BLD AUTO: 188 10E9/L (ref 150–450)
RBC # BLD AUTO: 4.52 10E12/L (ref 4.4–5.9)
TSH SERPL DL<=0.005 MIU/L-ACNC: 2.21 MU/L (ref 0.4–4)
VIT B12 SERPL-MCNC: 695 PG/ML (ref 193–986)
WBC # BLD AUTO: 6.3 10E9/L (ref 4–11)

## 2018-03-01 PROCEDURE — 99214 OFFICE O/P EST MOD 30 MIN: CPT | Performed by: FAMILY MEDICINE

## 2018-03-01 PROCEDURE — 85652 RBC SED RATE AUTOMATED: CPT | Performed by: FAMILY MEDICINE

## 2018-03-01 PROCEDURE — 36415 COLL VENOUS BLD VENIPUNCTURE: CPT | Performed by: FAMILY MEDICINE

## 2018-03-01 PROCEDURE — 84165 PROTEIN E-PHORESIS SERUM: CPT | Performed by: FAMILY MEDICINE

## 2018-03-01 PROCEDURE — 85027 COMPLETE CBC AUTOMATED: CPT | Performed by: FAMILY MEDICINE

## 2018-03-01 PROCEDURE — 84443 ASSAY THYROID STIM HORMONE: CPT | Performed by: FAMILY MEDICINE

## 2018-03-01 PROCEDURE — 82607 VITAMIN B-12: CPT | Performed by: FAMILY MEDICINE

## 2018-03-01 PROCEDURE — 00000402 ZZHCL STATISTIC TOTAL PROTEIN: Performed by: FAMILY MEDICINE

## 2018-03-01 NOTE — NURSING NOTE
"Chief Complaint   Patient presents with     Consult       Initial /66 (BP Location: Right arm, Patient Position: Chair, Cuff Size: Adult Regular)  Pulse 77  Temp 97.7  F (36.5  C) (Oral)  Ht 5' 10\" (1.778 m)  Wt 207 lb 9.6 oz (94.2 kg)  SpO2 99%  BMI 29.79 kg/m2 Estimated body mass index is 29.79 kg/(m^2) as calculated from the following:    Height as of this encounter: 5' 10\" (1.778 m).    Weight as of this encounter: 207 lb 9.6 oz (94.2 kg).    Medication Reconciliation: complete    Health Maintenance addressed:  NONE    n/a    Pati Zimmer CMA                         "

## 2018-03-01 NOTE — MR AVS SNAPSHOT
After Visit Summary   3/1/2018    Bill Neri    MRN: [id number]           Patient Information     Date Of Birth          1960        Visit Information        Provider Department      3/1/2018 9:00 AM Jsoe Ruiz MD [facility]        Today's Diagnoses     Urinary retention with incomplete bladder emptying    -  1    Chronic right-sided low back pain with right-sided sciatica        Leg numbness           Follow-ups after your visit        Your next 10 appointments already scheduled     Apr 30, 2018  4:00 PM CDT   Return Visit with Biju Che MD   [facility] [place] ([facility])    [address]  [address]  [facility] [id number] [phone]              Future tests that were ordered for you today     Open Future Orders        Priority Expected Expires Ordered    MR Lumbar Spine w/o Contrast Routine  3/1/2019 3/1/2018            Who to contact     If you have questions or need follow up information about today's clinic visit or your schedule please contact [facility] directly at [phone].  Normal or non-critical lab and imaging results will be communicated to you by [employer], letter or phone within 4 business days after the clinic has received the results. If you do not hear from us within 7 days, please contact the clinic through [employer] or phone. If you have a critical or abnormal lab result, we will notify you by phone as soon as possible.  Submit refill requests through [employer] or call your pharmacy and they will forward the refill request to us. Please allow 3 business days for your refill to be completed.          Additional Information About Your Visit        MyChart Information     [employer] gives you secure access to your electronic health record. If you see a primary care provider, you can also send messages to your care team and make appointments. If you have  "questions, please call your primary care clinic.  If you do not have a primary care provider, please call [phone] and they will assist you.        Care EveryWhere ID     This is your Care EveryWhere ID. This could be used by other organizations to access your [place] medical records  [id number]        Your Vitals Were     Pulse Temperature Height Pulse Oximetry BMI (Body Mass Index)       77 97.7  F (36.5  C) (Oral) 5' 10\" (1.778 m) 99% 29.79 kg/m2        Blood Pressure from Last 3 Encounters:   03/01/18 106/66   12/01/17 110/70   11/11/17 102/60    Weight from Last 3 Encounters:   03/01/18 207 lb 9.6 oz (94.2 kg)   01/29/18 190 lb (86.2 kg)   01/08/18 190 lb (86.2 kg)              We Performed the Following     CBC with platelets     Erythrocyte sedimentation rate auto     Protein electrophoresis     TSH with free T4 reflex     Vitamin B12          Today's Medication Changes          These changes are accurate as of 3/1/18  9:31 AM.  If you have any questions, ask your nurse or doctor.               Stop taking these medicines if you haven't already. Please contact your care team if you have questions.     ciprofloxacin 500 MG tablet   Commonly known as:  CIPRO   Stopped by:  Jose Ruiz MD                    Primary Care Provider Office Phone # Fax #    Jose Ruiz [phone] [address] [facility] [address]        Equal Access to Services     [facility] AH: Hadii alyssa robertsono Soheather, waaxda luqadaha, qaybta kaalmada shira, tomasz luis. So [facility] [phone].    ATENCIÓN: Si habla español, tiene a clarke disposición servicios gratuitos de asistencia lingüística. Llbecca al [phone].    We comply with applicable federal civil rights laws and [place] laws. We do not discriminate on the basis of race, color, national origin, age, disability, sex, sexual orientation, or gender identity.            Thank you!     Thank you for " choosing [facility]  for your care. Our goal is always to provide you with excellent care. Hearing back from our patients is one way we can continue to improve our services. Please take a few minutes to complete the written survey that you may receive in the mail after your visit with us. Thank you!             Your Updated Medication List - Protect others around you: Learn how to safely use, store and throw away your medicines at www.disposemymeds.org.          This list is accurate as of 3/1/18  9:31 AM.  Always use your most recent med list.                   Brand Name Dispense Instructions for use Diagnosis    ADVIL PO      Take 200 mg by mouth as needed for moderate pain        finasteride 5 MG tablet    PROSCAR    90 tablet    Take 1 tablet (5 mg) by mouth daily    Enlarged prostate       tamsulosin 0.4 MG capsule    FLOMAX    180 capsule    TAKE 2 CAPSULES (0.8 MG) BY MOUTH DAILY    Benign non-nodular prostatic hyperplasia with lower urinary tract symptoms

## 2018-03-01 NOTE — PROGRESS NOTES
SUBJECTIVE:   Bill Neri is a 57 year old male who presents to clinic today for the following health issues:    Patient with history of enlarged prostate with lower urinary tract symptoms. Patient continues to have urinary symptoms with urinary hesitancy and is self catheterizing. Treated with tamsulosin for benign prostatic hypertrophy.  Has had treatment for UTI in September 2016 culturing actinomyces at that time and also urine microscopy in February concerning for infection but no culture results positive at that time.  PSA 2/2015 was normal. Followed by urology.     Patient reports severe back pain for the past few years, which has worsened recently. The pain radiates to the front of his right hip. The pain increases when laying down and sitting for long periods of time. It is not painful to walk or work out. Denies leg weakness or numbness.     Patient reports hair loss on his leg bilaterally over the past twelve months.      Problem list and histories reviewed & adjusted, as indicated.  Additional history: as documented    Patient Active Problem List   Diagnosis     Anxiety state     HYPERLIPIDEMIA LDL GOAL <130     Enlarged prostate with lower urinary tract symptoms (LUTS)     Advanced directives, counseling/discussion     Past Surgical History:   Procedure Laterality Date     APPENDECTOMY      1983     TESTICLE SURGERY       VASECTOMY         Social History   Substance Use Topics     Smoking status: Never Smoker     Smokeless tobacco: Never Used     Alcohol use Yes      Comment: 4 beers a week     Family History   Problem Relation Age of Onset     Family History Negative No family hx of      Cancer - colorectal No family hx of            Reviewed and updated as needed this visit by clinical staff  Tobacco  Allergies  Meds  Med Hx  Surg Hx  Fam Hx  Soc Hx      Reviewed and updated as needed this visit by Provider         ROS:  : as above   MUSCULOSKELETAL: as above   NEURO: NEGATIVE for  "weakness or paresthesias  ENDOCRINE: as above     This document serves as a record of the services and decisions personally performed and made by Jose Ruiz MD. It was created on his behalf by Sis Hale, a trained medical scribe. The creation of this document is based on the provider's statements to the medical scribe.  Sis Hale 9:15 AM March 1, 2018    OBJECTIVE:     /66 (BP Location: Right arm, Patient Position: Chair, Cuff Size: Adult Regular)  Pulse 77  Temp 97.7  F (36.5  C) (Oral)  Ht 1.778 m (5' 10\")  Wt 94.2 kg (207 lb 9.6 oz)  SpO2 99%  BMI 29.79 kg/m2  Body mass index is 29.79 kg/(m^2).  GENERAL: healthy, alert and no distress  CV: regular rate and rhythm, normal S1 S2, no S3 or S4, no murmur, click or rub, no peripheral edema and peripheral pulses strong  Comprehensive back pain exam:  No tenderness, Lower extremity strength functional and equal on both sides, Lower extremity reflexes 2+/4 bilaterally , Lower extremity sensation normal and equal on both sides and Straight leg raise negative bilaterally    Diagnostic Test Results:  No results found for this or any previous visit (from the past 24 hour(s)).    ASSESSMENT/PLAN:     1. Urinary retention with incomplete bladder emptying  Recommend evaluation for possible myelopathy causing urinary issues with MRI lumbar spine initially with his back pain issue.  - MR Lumbar Spine w/o Contrast; Future    2. Chronic right-sided low back pain with right-sided sciatica  - MR Lumbar Spine w/o Contrast; Future  - CBC with platelets  - Vitamin B12  - TSH with free T4 reflex  - Protein electrophoresis  - Erythrocyte sedimentation rate auto    3. Leg numbness  - CBC with platelets  - Vitamin B12  - TSH with free T4 reflex  - Protein electrophoresis  - Erythrocyte sedimentation rate auto    The information in this document, created by the medical scribe for me, accurately reflects the services I personally performed and the decisions made by " me. I have reviewed and approved this document for accuracy prior to leaving the patient care area.  March 1, 2018 9:44 AM    Jose Ruiz MD  [facility]

## 2018-03-02 LAB
ALBUMIN SERPL ELPH-MCNC: 4.3 G/DL (ref 3.7–5.1)
ALPHA1 GLOB SERPL ELPH-MCNC: 0.3 G/DL (ref 0.2–0.4)
ALPHA2 GLOB SERPL ELPH-MCNC: 0.7 G/DL (ref 0.5–0.9)
B-GLOBULIN SERPL ELPH-MCNC: 0.9 G/DL (ref 0.6–1)
GAMMA GLOB SERPL ELPH-MCNC: 0.9 G/DL (ref 0.7–1.6)
M PROTEIN SERPL ELPH-MCNC: 0 G/DL
PROT PATTERN SERPL ELPH-IMP: NORMAL

## 2018-03-06 ENCOUNTER — TRANSFERRED RECORDS (OUTPATIENT)
Dept: HEALTH INFORMATION MANAGEMENT | Facility: CLINIC | Age: 58
End: 2018-03-06

## 2018-03-09 ENCOUNTER — TELEPHONE (OUTPATIENT)
Dept: FAMILY MEDICINE | Facility: CLINIC | Age: 58
End: 2018-03-09

## 2018-03-09 DIAGNOSIS — M51.06 LUMBAR DISC DISORDER WITH MYELOPATHY: Primary | ICD-10-CM

## 2018-03-09 DIAGNOSIS — M51.26 LUMBAR DISC HERNIATION: ICD-10-CM

## 2018-03-09 PROBLEM — D64.9 ANEMIA: Status: ACTIVE | Noted: 2018-03-09

## 2018-03-09 NOTE — TELEPHONE ENCOUNTER
Recommend follow-up with neurosurgery referral.  Please call and help set this up for him.  Patient with urinary retention issues that may be related and would hope they can see him next week for evaluation.     Neurosurgery referral placed to see Dr. Mcgregor.    Need patient to have a copy of MRI result from [facility] - I don't currently have this - needs to have copy for that visit.

## 2018-03-09 NOTE — TELEPHONE ENCOUNTER
Patient calling stating he had an MRI of Lumbar Spine done at [facility] 3/6/2018 and would like Dr. Ruiz to review the results as soon as possible.  Patient continues to have severe back pain, difficulty walking and is self cathing.  He is currently out of town and has a follow up appointment with Dr. Ruiz on 3/15/2018 but would like a call back from Dr. Ruiz regarding his results.  [phone].    I called [facility] is [place] and had them fax over the report.  Report received.      Conclusion:  Multilevel degenerative lumbar spondylosis with the following major findings:    1.  Advanced L4-5 disc degeneration, with posterior and right-sided disc herniation with caudal dissection.  Both L5 nerve roots appear impinged upon.  Foraminal narrowing laterally on the right with L4 ganglionic impingement.  2.  Far lateral annular fissure and disc protrusion, L3-4 on the right.  3.  L2-3 disc degeneration with posterior and right sided localized tear and disc herniation with caudal dissection.  4.  Advanced L1-2 disc degeneration with circumferential annular bulging and osteophytes.    Report faxed to [phone].  Please place on Dr. Ruiz's desk to review.    Tori Pappas RN

## 2018-03-10 NOTE — TELEPHONE ENCOUNTER
P: Neurosurgery Clinic [facility] [phone]. We called for appointment but after clinic hours, answering service only.  [facility] Staff, Please call on Monday.   Cornell Strong RN

## 2018-03-12 NOTE — TELEPHONE ENCOUNTER
First available appt to get patient in is on 3/22/18 at 7:15 am.  Appt scheduled but if he needs to be seen sooner will need a provider to provider call    Please let RN know as pt has not been informed of appt yet    Suzie Timmons RN, BSN

## 2018-03-12 NOTE — TELEPHONE ENCOUNTER
Can they review his chart to see if they can get him in sooner or if that is needed?  Maybe [facility] - Dr. Bailon can see him sooner as well - would need to check insurance coverage.

## 2018-03-12 NOTE — TELEPHONE ENCOUNTER
LVM for neurosurgery to see if they can review chart and let us know if they can see him sooner.  Left physician line number to call    Suzie Timmons RN, BSN

## 2018-03-26 ENCOUNTER — OFFICE VISIT (OUTPATIENT)
Dept: NEUROSURGERY | Facility: CLINIC | Age: 58
End: 2018-03-26

## 2018-03-26 VITALS
WEIGHT: 206.9 LBS | SYSTOLIC BLOOD PRESSURE: 116 MMHG | BODY MASS INDEX: 29.62 KG/M2 | DIASTOLIC BLOOD PRESSURE: 72 MMHG | HEART RATE: 76 BPM | HEIGHT: 70 IN

## 2018-03-26 DIAGNOSIS — N31.9 NEUROGENIC BLADDER: Primary | ICD-10-CM

## 2018-03-26 ASSESSMENT — PAIN SCALES - GENERAL: PAINLEVEL: MODERATE PAIN (4)

## 2018-03-26 NOTE — MR AVS SNAPSHOT
After Visit Summary   3/26/2018    Bill Neri    MRN: [id number]           Patient Information     Date Of Birth          1960        Visit Information        Provider Department      3/26/2018 9:15 AM Jairo Elias MD [facility] Neurosurgery        Today's Diagnoses     Neurogenic bladder    -  1       Follow-ups after your visit        Additional Services     NEUROLOGY ADULT REFERRAL       Your provider has referred you for the following:   Consult at [facility]: Neurology Clinic [facility] [phone]   [email or web address]  General Neurology    Evaluate patient with neurogenic bladder without structural cause.    Please be aware that coverage of these services is subject to the terms and limitations of your health insurance plan.  Call member services at your health plan with any benefit or coverage questions.      Please bring the following with you to your appointment:    (1) Any X-Rays, CTs or MRIs which have been performed.  Contact the facility where they were done to arrange for  prior to your scheduled appointment.    (2) List of current medications  (3) This referral request   (4) Any documents/labs given to you for this referral                  Your next 10 appointments already scheduled     Apr 30, 2018  4:00 PM CDT   Return Visit with Biju Che MD   [facility] [place] ([facility])    [facility] [address]  [address]  [facility] [address] [phone]              Who to contact     Please call your clinic at [phone] to:    Ask questions about your health    Make or cancel appointments    Discuss your medicines    Learn about your test results    Speak to your doctor            Additional Information About Your Visit        MyChart Information     [employer] gives you secure access to your electronic health record. If you see a primary care provider, you can  "also send messages to your care team and make appointments. If you have questions, please call your primary care clinic.  If you do not have a primary care provider, please call [phone] and they will assist you.      [employer] is an electronic gateway that provides easy, online access to your medical records. With [employer], you can request a clinic appointment, read your test results, renew a prescription or communicate with your care team.     To access your existing account, please contact your [facility] or call [phone] for assistance.        Care EveryWhere ID     This is your Care EveryWhere ID. This could be used by other organizations to access your [place] medical records  [id number]        Your Vitals Were     Pulse Height BMI (Body Mass Index)             76 1.778 m (5' 10\") 29.69 kg/m2          Blood Pressure from Last 3 Encounters:   03/26/18 116/72   03/01/18 106/66   12/01/17 110/70    Weight from Last 3 Encounters:   03/26/18 93.8 kg (206 lb 14.4 oz)   03/01/18 94.2 kg (207 lb 9.6 oz)   01/29/18 86.2 kg (190 lb)              We Performed the Following     NEUROLOGY ADULT REFERRAL        Primary Care Provider Office Phone # Fax #    Jose Ruiz [phone] [address] [place] [id number]        Equal Access to Services     STEPHIE JULIO : Hadii alyssa ku hadasho Soheather, waaxda luqadaha, qaybta kaalmada adeegyada, tomasz payne . So [facility] [phone].    ATENCIÓN: Si habla español, tiene a clarke disposición servicios gratuitos de asistencia lingüística. Julianame al [phone].    We comply with applicable federal civil rights laws and [place] laws. We do not discriminate on the basis of race, color, national origin, age, disability, sex, sexual orientation, or gender identity.            Thank you!     Thank you for choosing [facility]  for your care. Our goal is always to provide you with " excellent care. Hearing back from our patients is one way we can continue to improve our services. Please take a few minutes to complete the written survey that you may receive in the mail after your visit with us. Thank you!             Your Updated Medication List - Protect others around you: Learn how to safely use, store and throw away your medicines at www.disposemymeds.org.          This list is accurate as of 3/26/18 11:59 PM.  Always use your most recent med list.                   Brand Name Dispense Instructions for use Diagnosis    ADVIL PO      Take 200 mg by mouth as needed for moderate pain        finasteride 5 MG tablet    PROSCAR    90 tablet    Take 1 tablet (5 mg) by mouth daily    Enlarged prostate

## 2018-03-26 NOTE — LETTER
3/26/2018       RE: Bill Neri  [address] [facility] [address]     Dear Colleague,    Thank you for referring your patient, Bill Neri, to the [facility] NEUROSURGERY at [facility]. Please see a copy of my visit note below.    Service Date: 03/26/2018      CHIEF COMPLAINT:  Urinary retention.  History of  back pain.      HISTORY OF PRESENT ILLNESS:  Mr. Neri is a 57-year-old healthy gentleman who presents today for evaluation of urinary retention.  The patient reports that he started having this problem since 12/2017 where he noticed difficulty urinating or not having the feeling to urinate. He was seen by a urologist and was instructed to catheterize himself, which he does about 4 times daily.  He was extensively evaluated and his condition was thought initially to be due to benign prostatic hypertrophy.  He also underwent urodynamic studies which showed no function of the detrusor muscle.  Alongside, the patient also complains of back pain of many years. The pain gets worse in sitting for a period of time or when he tries to stand but it gets better during activity. It is tolerable and treated with over the counter pain meds. During his work-up, he underwent an MRI of the lumbar spine by the recommendation of his PCP and was referred here for evaluation.  The patient otherwise denies any weakness in his extremities.  He does report some numbness involving the anterior thighs bilaterally. He has no issues with bowel function. He denies any previous history of back surgeries or radicular symptoms in the past.        PAST MEDICAL HISTORY:   Past Medical History:   Diagnosis Date     Anxiety state, unspecified      Mumps      Other and unspecified hyperlipidemia       SURGICAL HISTORY:   Past Surgical History:   Procedure Laterality Date     APPENDECTOMY      1983     TESTICLE SURGERY       VASECTOMY         PHYSICAL EXAMINATION:  The patient is awake, alert and  oriented x3.  His cranial nerves are grossly intact.  Muscle strength is 5/5 in bilateral hip flexion, knee flexion, extension and ankle dorsi plantarflexion.  He is also 5/5 in bilateral upper extremities.  His reflexes were +3 in bilateral knees and ankles and +2 bilateral biceps and brachioradialis.  He has no Goddard, he has no Babinski and has no clonus.  Sensation seems to be intact to light touch throughout upper and lower extremities.  Gait is within normal limits.      IMAGING:  MRI of the lumbar spine done on 2018 reveals multilevel degenerative changes with mild scoliosis but there is no obvious compressive lesion of the thecal sac or foramina.      ASSESSMENT:  Mr. Cast is a 57-year-old gentleman presenting for evaluation of urinary retention and back pain.  I staffed this patient with Dr. Elias. We discussed the clinical and imaging findings. It is unusual to have an isolated urinary retention in the absence of weakness or pain in the lower extremities. The MRI of the lumbar spine does not reveal a compressive lesion that would explain his symptoms. However, we would like to obtain an MRI of the brain, cervical and thoracic spine to rule out other pathologies. If this is negative, we would recommend neurology evaluation for neurological causes of urinary retention.  The patient will come back for further discussion once the images are obtained.     I saw the patient with the resident.  I have reviewed and edited the resident note and agree with the plan of care.           D: 2018   T: 2018   MT: LG      Name:     ANGEL CAST   MRN:      [id number]        Account:      [id number]   :      1960           Service Date: 2018      Document: [id number]        Again, thank you for allowing me to participate in the care of your patient.      Sincerely,    Jairo Elias MD

## 2018-03-26 NOTE — PROGRESS NOTES
Service Date: 03/26/2018      CHIEF COMPLAINT:  Urinary retention.  History of  back pain.      HISTORY OF PRESENT ILLNESS:  Mr. Neri is a 57-year-old healthy gentleman who presents today for evaluation of urinary retention.  The patient reports that he started having this problem since 12/2017 where he noticed difficulty urinating or not having the feeling to urinate. He was seen by a urologist and was instructed to catheterize himself, which he does about 4 times daily.  He was extensively evaluated and his condition was thought initially to be due to benign prostatic hypertrophy.  He also underwent urodynamic studies which showed no function of the detrusor muscle.  Alongside, the patient also complains of back pain of many years. The pain gets worse in sitting for a period of time or when he tries to stand but it gets better during activity. It is tolerable and treated with over the counter pain meds. During his work-up, he underwent an MRI of the lumbar spine by the recommendation of his PCP and was referred here for evaluation.  The patient otherwise denies any weakness in his extremities.  He does report some numbness involving the anterior thighs bilaterally. He has no issues with bowel function. He denies any previous history of back surgeries or radicular symptoms in the past.        PAST MEDICAL HISTORY:   Past Medical History:   Diagnosis Date     Anxiety state, unspecified      Mumps      Other and unspecified hyperlipidemia       SURGICAL HISTORY:   Past Surgical History:   Procedure Laterality Date     APPENDECTOMY      1983     TESTICLE SURGERY       VASECTOMY         PHYSICAL EXAMINATION:  The patient is awake, alert and oriented x3.  His cranial nerves are grossly intact.  Muscle strength is 5/5 in bilateral hip flexion, knee flexion, extension and ankle dorsi plantarflexion.  He is also 5/5 in bilateral upper extremities.  His reflexes were +3 in bilateral knees and ankles and +2 bilateral  biceps and brachioradialis.  He has no Goddard, he has no Babinski and has no clonus.  Sensation seems to be intact to light touch throughout upper and lower extremities.  Gait is within normal limits.      IMAGING:  MRI of the lumbar spine done on 2018 reveals multilevel degenerative changes with mild scoliosis but there is no obvious compressive lesion of the thecal sac or foramina.      ASSESSMENT:  Mr. Cast is a 57-year-old gentleman presenting for evaluation of urinary retention and back pain.  I staffed this patient with Dr. Kidd. We discussed the clinical and imaging findings. It is unusual to have an isolated urinary retention in the absence of weakness or pain in the lower extremities. The MRI of the lumbar spine does not reveal a compressive lesion that would explain his symptoms. However, we would like to obtain an MRI of the brain, cervical and thoracic spine to rule out other pathologies. If this is negative, we would recommend neurology evaluation for neurological causes of urinary retention.  The patient will come back for further discussion once the images are obtained.      PORSHA KIDD MD       As dictated by JULIEN GROSS MD     I saw the patient with the resident.  I have reviewed and edited the resident note and agree with the plan of care.      Porsha Kidd MD         D: 2018   T: 2018   MT: DENISE      Name:     ANGEL CAST   MRN:      [id number]        Account:      [id number]   :      1960           Service Date: 2018      Document: [id number]

## 2018-03-29 ENCOUNTER — TRANSFERRED RECORDS (OUTPATIENT)
Dept: HEALTH INFORMATION MANAGEMENT | Facility: CLINIC | Age: 58
End: 2018-03-29

## 2018-03-29 DIAGNOSIS — N40.1 BENIGN NON-NODULAR PROSTATIC HYPERPLASIA WITH LOWER URINARY TRACT SYMPTOMS: ICD-10-CM

## 2018-03-29 RX ORDER — TAMSULOSIN HYDROCHLORIDE 0.4 MG/1
CAPSULE ORAL
Qty: 180 CAPSULE | Refills: 0 | Status: SHIPPED | OUTPATIENT
Start: 2018-03-29 | End: 2018-05-10

## 2018-04-19 ENCOUNTER — MEDICAL CORRESPONDENCE (OUTPATIENT)
Dept: HEALTH INFORMATION MANAGEMENT | Facility: CLINIC | Age: 58
End: 2018-04-19

## 2018-04-26 DIAGNOSIS — R33.9 URINARY RETENTION: Primary | ICD-10-CM

## 2018-04-30 ENCOUNTER — OFFICE VISIT (OUTPATIENT)
Dept: UROLOGY | Facility: CLINIC | Age: 58
End: 2018-04-30
Payer: COMMERCIAL

## 2018-04-30 VITALS — OXYGEN SATURATION: 98 % | WEIGHT: 206 LBS | HEART RATE: 56 BPM | HEIGHT: 70 IN | BODY MASS INDEX: 29.49 KG/M2

## 2018-04-30 DIAGNOSIS — R33.9 URINARY RETENTION: Primary | ICD-10-CM

## 2018-04-30 PROCEDURE — 99213 OFFICE O/P EST LOW 20 MIN: CPT | Performed by: UROLOGY

## 2018-04-30 ASSESSMENT — PAIN SCALES - GENERAL: PAINLEVEL: SEVERE PAIN (6)

## 2018-04-30 NOTE — MR AVS SNAPSHOT
After Visit Summary   4/30/2018    iBll Neri    MRN: [id number]           Patient Information     Date Of Birth          1960        Visit Information        Provider Department      4/30/2018 4:00 PM Biju Che MD [facility] [place]        Today's Diagnoses     Urinary retention    -  1       Follow-ups after your visit        Follow-up notes from your care team     Return if symptoms worsen or fail to improve.      Your next 10 appointments already scheduled     May 10, 2018  2:25 PM CDT   (Arrive by 2:10 PM)   New Patient Visit with Jairo Choi MD   [facility] ([facility] [place])    [address]  [address] [facility] [address] [phone]              Who to contact     If you have questions or need follow up information about today's clinic visit or your schedule please contact [facility] [place] directly at [phone].  Normal or non-critical lab and imaging results will be communicated to you by Korbitechart, letter or phone within 4 business days after the clinic has received the results. If you do not hear from us within 7 days, please contact the clinic through Korbitechart or phone. If you have a critical or abnormal lab result, we will notify you by phone as soon as possible.  Submit refill requests through [employer] or call your pharmacy and they will forward the refill request to us. Please allow 3 business days for your refill to be completed.          Additional Information About Your Visit        MyChart Information     [employer] gives you secure access to your electronic health record. If you see a primary care provider, you can also send messages to your care team and make appointments. If you have questions, please call your primary care clinic.  If you do not have a primary care provider, please call [phone] and they will assist you.       "  Care EveryWhere ID     This is your Care EveryWhere ID. This could be used by other organizations to access your [place] medical records  [id number]        Your Vitals Were     Pulse Height Pulse Oximetry BMI (Body Mass Index)          56 1.778 m (5' 10\") 98% 29.56 kg/m2         Blood Pressure from Last 3 Encounters:   03/26/18 116/72   03/01/18 106/66   12/01/17 110/70    Weight from Last 3 Encounters:   04/30/18 93.4 kg (206 lb)   03/26/18 93.8 kg (206 lb 14.4 oz)   03/01/18 94.2 kg (207 lb 9.6 oz)              Today, you had the following     No orders found for display       Primary Care Provider Office Phone # Fax #    Jose Ruiz [phone] [address] [facility] [address]        Equal Access to Services     DEVORAH [facility] : Hadii aad ku hadasho Soomaali, waaxda luqadaha, qaybta kaalmada adeegyada, waxay fermin payne . So [facility] [phone].    ATENCIÓN: Si habla español, tiene a clarke disposición servicios gratuitos de asistencia lingüística. Nanda al [phone].    We comply with applicable federal civil rights laws and [place] laws. We do not discriminate on the basis of race, color, national origin, age, disability, sex, sexual orientation, or gender identity.            Thank you!     Thank you for choosing [facility]OLOG[facility] [place]  for your care. Our goal is always to provide you with excellent care. Hearing back from our patients is one way we can continue to improve our services. Please take a few minutes to complete the written survey that you may receive in the mail after your visit with us. Thank you!             Your Updated Medication List - Protect others around you: Learn how to safely use, store and throw away your medicines at www.disposemymeds.org.          This list is accurate as of 4/30/18  4:21 PM.  Always use your most recent med list.                   Brand Name Dispense Instructions for use " Diagnosis    ADVIL PO      Take 200 mg by mouth as needed for moderate pain        finasteride 5 MG tablet    PROSCAR    90 tablet    Take 1 tablet (5 mg) by mouth daily    Enlarged prostate       tamsulosin 0.4 MG capsule    FLOMAX    180 capsule    TAKE 2 CAPSULES (0.8 MG) BY MOUTH DAILY    Benign non-nodular prostatic hyperplasia with lower urinary tract symptoms

## 2018-04-30 NOTE — NURSING NOTE
DID NOT DO PVR AND I CANT CANCEL IT.  Pt was dizzy a few times..... From flomax?  Pt wants to talk to you about meds.  Pt does SIC 4 times and does not get good output.  Pt states he is not having trouble with anything.  Pt has appt with neurologist.  CHASTITY Hale, CMA

## 2018-04-30 NOTE — PROGRESS NOTES
"Office Visit Note  [facility]  [phone]    UROLOGIC DIAGNOSES:   Enlarged prostate, urinary retention    CURRENT INTERVENTIONS:   Flomax, finasteride, self catheterization    HISTORY:   Ed returns to clinic today with questions about his condition and about self-catheterization. He has been self catheterizing 4 times daily without any difficulty. He says he usually gets out 200 -250 mL at a time, making his total urine output for the day usually run 900 mL. He has not voiding at all between catheterizations and is not having any urge to void.      PAST MEDICAL HISTORY:   Past Medical History:   Diagnosis Date     Anxiety state, unspecified      Mumps      Other and unspecified hyperlipidemia        PAST SURGICAL HISTORY:   Past Surgical History:   Procedure Laterality Date     APPENDECTOMY      1983     TESTICLE SURGERY       VASECTOMY         FAMILY HISTORY:   Family History   Problem Relation Age of Onset     Family History Negative No family hx of      Cancer - colorectal No family hx of        SOCIAL HISTORY:   Social History   Substance Use Topics     Smoking status: Never Smoker     Smokeless tobacco: Never Used     Alcohol use Yes      Comment: 4 beers a week       Current Outpatient Prescriptions   Medication     finasteride (PROSCAR) 5 MG tablet     Ibuprofen (ADVIL PO)     tamsulosin (FLOMAX) 0.4 MG capsule     No current facility-administered medications for this visit.          PHYSICAL EXAM:    Pulse 56  Ht 1.778 m (5' 10\")  Wt 93.4 kg (206 lb)  SpO2 98%  BMI 29.56 kg/m2    HEENT: Normocephalic and atraumatic   Cardiac: Not done  Back/Flank: Not done  CNS/PNS: Not done  Respiratory: Normal non-labored breathing  Abdomen: Soft nontender and nondistended  Peripheral Vascular: Not done  Mental Status: Not done    Penis: Not done  Scrotal Skin: Not done  Testicles: Not done  Epididymis: Not done  Digital Rectal Exam:     Cystoscopy: Not done    Imaging: None    Urinalysis: UA " RESULTS:  Recent Labs   Lab Test  01/26/18   0907   12/01/17   1816   COLOR  Yellow   < >  Yellow   APPEARANCE  Clear   < >  Cloudy   URINEGLC  Negative   < >  Negative   URINEBILI  Negative   < >  Negative   URINEKETONE  Negative   < >  Negative   SG  1.025   < >  1.025   UBLD  Negative   < >  Large*   URINEPH  5.0   < >  7.5*   PROTEIN  Trace*   < >  100*   UROBILINOGEN  0.2   < >  0.2   NITRITE  Negative   < >  Negative   LEUKEST  Negative   < >  Large*   RBCU   --    --   25-50*   WBCU   --    --   25-50*    < > = values in this interval not displayed.       PSA:     Post Void Residual:     Other labs: None today      IMPRESSION:  Urinary retention    PLAN:  I counseled him that it would be fine to go down to catheterizing 3 times daily. I also counseled him that he should stop taking the Flomax and finasteride at this time. He had a few general questions about his condition never answered for him. I recommended that he follow-up with me again in the future if he has any sensation or urge to urinate or if he begins to urinate normally on his own, otherwise he will continue with self-catheterization 3 times daily.    Total Time: 15 minutes                                      Total in Consultation: 15 minutes      Biju Che M.D.

## 2018-04-30 NOTE — LETTER
"4/30/2018       RE: Bill Neri  [address] [facility] [address]     Dear Colleague,    Thank you for referring your patient, Bill Neri, to the [facility] UROLOGY CLINIC [place] at [facility]. Please see a copy of my visit note below.    Office Visit Note  [facility]  [phone]    UROLOGIC DIAGNOSES:   Enlarged prostate, urinary retention    CURRENT INTERVENTIONS:   Flomax, finasteride, self catheterization    HISTORY:   Ed returns to clinic today with questions about his condition and about self-catheterization. He has been self catheterizing 4 times daily without any difficulty. He says he usually gets out 200 -250 mL at a time, making his total urine output for the day usually run 900 mL. He has not voiding at all between catheterizations and is not having any urge to void.      PAST MEDICAL HISTORY:   Past Medical History:   Diagnosis Date     Anxiety state, unspecified      Mumps      Other and unspecified hyperlipidemia        PAST SURGICAL HISTORY:   Past Surgical History:   Procedure Laterality Date     APPENDECTOMY      1983     TESTICLE SURGERY       VASECTOMY         FAMILY HISTORY:   Family History   Problem Relation Age of Onset     Family History Negative No family hx of      Cancer - colorectal No family hx of        SOCIAL HISTORY:   Social History   Substance Use Topics     Smoking status: Never Smoker     Smokeless tobacco: Never Used     Alcohol use Yes      Comment: 4 beers a week       Current Outpatient Prescriptions   Medication     finasteride (PROSCAR) 5 MG tablet     Ibuprofen (ADVIL PO)     tamsulosin (FLOMAX) 0.4 MG capsule     No current facility-administered medications for this visit.          PHYSICAL EXAM:    Pulse 56  Ht 1.778 m (5' 10\")  Wt 93.4 kg (206 lb)  SpO2 98%  BMI 29.56 kg/m2    HEENT: Normocephalic and atraumatic   Cardiac: Not done  Back/Flank: Not done  CNS/PNS: Not " done  Respiratory: Normal non-labored breathing  Abdomen: Soft nontender and nondistended  Peripheral Vascular: Not done  Mental Status: Not done    Penis: Not done  Scrotal Skin: Not done  Testicles: Not done  Epididymis: Not done  Digital Rectal Exam:     Cystoscopy: Not done    Imaging: None    Urinalysis: UA RESULTS:  Recent Labs   Lab Test  01/26/18   0907   12/01/17   1816   COLOR  Yellow   < >  Yellow   APPEARANCE  Clear   < >  Cloudy   URINEGLC  Negative   < >  Negative   URINEBILI  Negative   < >  Negative   URINEKETONE  Negative   < >  Negative   SG  1.025   < >  1.025   UBLD  Negative   < >  Large*   URINEPH  5.0   < >  7.5*   PROTEIN  Trace*   < >  100*   UROBILINOGEN  0.2   < >  0.2   NITRITE  Negative   < >  Negative   LEUKEST  Negative   < >  Large*   RBCU   --    --   25-50*   WBCU   --    --   25-50*    < > = values in this interval not displayed.       PSA:     Post Void Residual:     Other labs: None today      IMPRESSION:  Urinary retention    PLAN:  I counseled him that it would be fine to go down to catheterizing 3 times daily. I also counseled him that he should stop taking the Flomax and finasteride at this time. He had a few general questions about his condition never answered for him. I recommended that he follow-up with me again in the future if he has any sensation or urge to urinate or if he begins to urinate normally on his own, otherwise he will continue with self-catheterization 3 times daily.    Total Time: 15 minutes                                      Total in Consultation: 15 minutes      Again, thank you for allowing me to participate in the care of your patient.      Sincerely,    Biju Che MD

## 2018-05-10 ENCOUNTER — OFFICE VISIT (OUTPATIENT)
Dept: NEUROLOGY | Facility: CLINIC | Age: 58
End: 2018-05-10

## 2018-05-10 VITALS
SYSTOLIC BLOOD PRESSURE: 108 MMHG | BODY MASS INDEX: 29.48 KG/M2 | WEIGHT: 205.91 LBS | DIASTOLIC BLOOD PRESSURE: 69 MMHG | HEIGHT: 70 IN | HEART RATE: 56 BPM

## 2018-05-10 DIAGNOSIS — N31.9 NEUROGENIC BLADDER: Primary | ICD-10-CM

## 2018-05-10 PROBLEM — M47.815 SPONDYLOSIS OF THORACOLUMBAR REGION WITHOUT MYELOPATHY OR RADICULOPATHY: Status: ACTIVE | Noted: 2018-05-10

## 2018-05-10 PROBLEM — M51.35: Status: ACTIVE | Noted: 2018-03-09

## 2018-05-10 ASSESSMENT — ENCOUNTER SYMPTOMS
MYALGIAS: 0
BACK PAIN: 1
MUSCLE WEAKNESS: 0
MUSCLE CRAMPS: 0
JOINT SWELLING: 0
NECK PAIN: 0
DIFFICULTY URINATING: 1
ARTHRALGIAS: 0
STIFFNESS: 0

## 2018-05-10 NOTE — NURSING NOTE
Chief Complaint   Patient presents with     Consult     [facility]- NEUROLOGY     Rosa Andre MA.

## 2018-05-10 NOTE — MR AVS SNAPSHOT
After Visit Summary   5/10/2018    Bill Neri    MRN: [id number]           Patient Information     Date Of Birth          1960        Visit Information        Provider Department      5/10/2018 2:25 PM Jairo Choi MD [facility] Neurology        Today's Diagnoses     Neurogenic bladder    -  1      Care Instructions    1. Offered MRI Pelvis w/ and w/o contrast, as well as EMG/NCS, and Mr. Neri will consider these evaluations and contact the clinic if he's interested.          Follow-ups after your visit        Follow-up notes from your care team     Return if symptoms worsen or fail to improve.      Your next 10 appointments already scheduled     Jun 07, 2018  4:25 PM CDT   (Arrive by 4:10 PM)   Return Visit with Jairo Choi MD   [facility] Neurology ([facility] [place])    [address] [place] [address] [phone]              Who to contact     Please call your clinic at [phone] to:    Ask questions about your health    Make or cancel appointments    Discuss your medicines    Learn about your test results    Speak to your doctor            Additional Information About Your Visit        MyChart Information     [employer] gives you secure access to your electronic health record. If you see a primary care provider, you can also send messages to your care team and make appointments. If you have questions, please call your primary care clinic.  If you do not have a primary care provider, please call [phone] and they will assist you.      [employer] is an electronic gateway that provides easy, online access to your medical records. With [employer], you can request a clinic appointment, read your test results, renew a prescription or communicate with your care team.     To access your existing account, please contact your [facility] or call [phone] for assistance.        Care EveryWhere ID     This is  "your Care EveryWhere ID. This could be used by other organizations to access your [place] medical records  [id number]        Your Vitals Were     Pulse Height BMI (Body Mass Index)             56 1.778 m (5' 10\") 29.54 kg/m2          Blood Pressure from Last 3 Encounters:   No data found for BP    Weight from Last 3 Encounters:   No data found for Wt              Today, you had the following     No orders found for display       Primary Care Provider Office Phone # Fax #    Jose Ruiz [phone] [address]  [facility] [address]        Equal Access to Services     [facility]: Hadii aad ku hadasho Soomaali, waaxda luqadaha, qaybta kaalmada adeegyada, tomasz payne . So [facility] [phone].    ATENCIÓN: Si habla español, tiene a clarke disposición servicios gratuitos de asistencia lingüística. [facility] [phone].    We comply with applicable federal civil rights laws and [place] laws. We do not discriminate on the basis of race, color, national origin, age, disability, sex, sexual orientation, or gender identity.            Thank you!     Thank you for choosing [facility] NEUROLOGY  for your care. Our goal is always to provide you with excellent care. Hearing back from our patients is one way we can continue to improve our services. Please take a few minutes to complete the written survey that you may receive in the mail after your visit with us. Thank you!             Your Updated Medication List - Protect others around you: Learn how to safely use, store and throw away your medicines at www.disposemymeds.org.          This list is accurate as of 5/10/18 11:59 PM.  Always use your most recent med list.                   Brand Name Dispense Instructions for use Diagnosis    ADVIL PO      Take 200 mg by mouth as needed for moderate pain          "

## 2018-05-10 NOTE — LETTER
5/10/2018       RE: Bill Neri  [address] [facility] [address]     Dear Colleague,    Thank you for referring your patient, Bill Neri, to the [facility] NEUROLOGY at [facility]. Please see a copy of my visit note below.      DEPARTMENT OF NEUROLOGY  Referral for: neurogenic bladder  Patient Name: Bill Neri  MRN: [id number]  : 1960  Date of Clinic Visit: May 10, 2018  Primary Care Provider: Jose Ruiz  Referring Provider: Jairo Elias    CHIEF COMPLAINT: urinary retention    HISTORY OF PRESENT ILLNESS:  Bill Neri is a 57 year old male presenting with urinary retention. Mr. Neri started noticing urinary difficulties when he had to urinary tract infections in series. This was rather acute onset winter 2017. Prior to this, he denies any significant illness or trauma. He initially went to Urology for workup. They bladder scanned him for more than 1.5 L, surprisingly he did not experience pain but did have an intact urge to urinate. He also reports moderate amounts of urinary leakage. He underwent urodynamic testing in 2018; the bladder volume reached 627mL before he felt the urge to void, but he was unable to voluntarily do so. He denies any erectile dysfunction, changes in his ejaculation, or altered penile sensation. He also denies bowel constipation and incontinence. He underwent MRI Lumbar spine without contrast which showed lumbar spondylosis at multiple levels, but no compressive lesions. He was seen by Neurosurgery who did not think his MRI L-spine was explanatory for his symptoms. They ordered MRI Brain, C/T-spine in late March, the results of which are summarized in the investigations portion of this note.   Review of systems for autonomic function: He denies palpitations or periods of skipped heartbeats; he reports a recent past history of orthostatic lightheadedness resulting in 2 falls in the past 6 weeks which has  "improved after stopping Flomax; denies skin flushing; denies decreased or excessive sweating but notes that he has had more than average sweating throughout his whole life; he has bilateral lower extremity hair loss for the past 1-2 years, but no hair loss in his groin or on his back; no skin changes including atrophy or pigmentation changes; no temperature changes in his limbs; denies constitutional symptoms including weight loss    ASSESSMENT AND PLAN:  Mr. Neri is a 57-year-old male presenting for urinary retention with raised volume threshold for urge and a reduced pain sensation at these high volumes. He also has associated loss of hair in his legs for the past 1-2 years. His ESR, TSH, and ELP were all normal. The combination of urinary dysfunction and hair loss in his extremities is suggestive of a small fiber neuropathy. Given the relatively clean review of systems, our suspicion for a systemic neuropathic process is extremely low. This would be more consistent with a localized lesion distal to the lumbar spine, perhaps in the pelvis. Alternatively, this might be related to idiopathic age-related changes in bladder function, as is the working hypothesis of his Urologist. We offered MRI imaging of his pelvis with and without contrast to look for any inflammatory or degenerative changes, as well as offering in EMG/NCS for large fiber neuropathy. At this point, he declined these studies but will consider them and will contact us when he is interested in pursuing them.    Patient was seen and discussed with Dr. Graham.    Jairo Choi MD  Neurology PGY3  [facility]  Pager: [phone]    PHYSICAL EXAMINATION:  Vitals: /69 (BP Location: Right arm, Patient Position: Chair, Cuff Size: Adult Regular)  Pulse 56  Ht 1.778 m (5' 10\")  Wt 93.4 kg (205 lb 14.6 oz)  BMI 29.54 kg/m2  General: Well-developed male, appears stated age, no acute distress  HEENT: Normocephalic, normal " lacrimation and moist mucosa  Cardiac: Normotensive, normal sinus rhythm, slightly bradycardic  Pulmonary: Nonlabored on room air  Abdomen: Nondistended  Extremities: No pitting edema, warm to palpation  Skin: Bilateral loss of hair in his legs distal and proximal with a small patch of preservative hair around his knees, no rashes, no skin atrophy, no skin hypertrophy or hypopigmentation, no areas of anhidrosis or excessive sweating, no abnormal flushing  Psych: Cooperative, appropriate mood and affect  Neuro:   Mental status: alert and oriented to person, place, and time; language is fluent with intact comprehension and naming   Cranial nerves: PERRL, EOMI with intact smooth pursuit, full visual fields, fundi shows no papilledema but has short arcuate dark pigmentation in the temporal aspects of his discs, no facial asymmetry or ptosis, facial sensation intact and symmetric, hearing intact to conversation, tongue and uvula are midline, no hypophonia, no dysarthria   Motor: No abnormal posture, tone, atrophy, or movements/fasciculations.   RIGHT LEFT    5 5   FDI 5 5   2nd/3rd digit flexion 5 5   2nd/3rd digit extension 5 5   4th/5th digit flexion 5 5   4th/5th digit extension 5 5   Biceps 5 5   Triceps 5 5   Deltoid 5 5   Hip flexion 5 5   Knee extension 5 5   Knee flexion 5 5   Dorsiflexion 5 5   Plantar flexion 5 5    Reflexes: absent Babinski.   RIGHT LEFT   Brachioradialis 2+ 2+   Biceps 2+ 2+   Triceps 2+ 2+   Patellar 2+ 2+   Achilles 2+ 2+    Sensory: Intact to light touch, pin prick, and proprioception in all extremities without extinction. Romberg exam within normal limits. Normal pull test.   Coordination: No dysmetria. No dysdiadochokinesia. No ataxia.   Gait: Normal width, stride length, turn, and arm swing.    INVESTIGATIONS:  ESR = 9  ELP = normal  TSH = 2.21  B12 = 695  PSA = 0.27    MRI Brain/Cervical/Thoracic: reports but not images were personally reviewed. There is no intrinsic cord pathology  in the cervical or thoracic spine. His cervical spine showed multilevel degenerative changes with reversal of the normal cervical lordosis, with severe left foraminal stenosis at C3-4 and moderate right foraminal stenosis at C5-6. His thoracic spine showed multilevel disc degeneration without foraminal stenoses. His cerebral imaging was normal, but notable for derangement of his temporomandibular joints.    REVIEW OF SYSTEMS: 12-point RoS negative except as per HPI.    ALLERGIES:  No Known Allergies    MEDICATIONS:  Current Outpatient Prescriptions   Medication Sig Dispense Refill     Ibuprofen (ADVIL PO) Take 200 mg by mouth as needed for moderate pain         PAST MEDICAL HISTORY:  Past Medical History:   Diagnosis Date     Anxiety state, unspecified      Mumps      Other and unspecified hyperlipidemia        PAST SURGICAL HISTORY:  Past Surgical History:   Procedure Laterality Date     APPENDECTOMY      1983     VASECTOMY  1997       FAMILY HISTORY:  Family History   Problem Relation Age of Onset     Lymphoma Sister      Family History Negative No family hx of      Cancer - colorectal No family hx of      Neuropathy No family hx of        SOCIAL HISTORY:  Works as a high-level executive for a national company. Frequently flies to [place] for business. He does not smoke. He drinks socially.  Social History     Social History     Marital status:      Spouse name: N/A     Number of children: N/A     Years of education: N/A     Occupational History     Not on file.     Social History Main Topics     Smoking status: Never Smoker     Smokeless tobacco: Never Used     Alcohol use Yes      Comment: 4 beers a week     Drug use: No     Sexual activity: Yes     Other Topics Concern     Parent/Sibling W/ Cabg, Mi Or Angioplasty Before 65f 55m? No     Social History Narrative

## 2018-05-10 NOTE — PATIENT INSTRUCTIONS
1. Offered MRI Pelvis w/ and w/o contrast, as well as EMG/NCS, and Mr. Neri will consider these evaluations and contact the clinic if he's interested.

## 2018-05-10 NOTE — PROGRESS NOTES
DEPARTMENT OF NEUROLOGY  Referral for: neurogenic bladder  Patient Name: Bill Neri  MRN: [id number]  : 1960  Date of Clinic Visit: May 10, 2018  Primary Care Provider: Jose Ruiz  Referring Provider: Jairo Elias    CHIEF COMPLAINT: urinary retention    HISTORY OF PRESENT ILLNESS:  Bill Neri is a 57 year old male presenting with urinary retention. Mr. Neri started noticing urinary difficulties when he had to urinary tract infections in series. This was rather acute onset winter 2017. Prior to this, he denies any significant illness or trauma. He initially went to Urology for workup. They bladder scanned him for more than 1.5 L, surprisingly he did not experience pain but did have an intact urge to urinate. He also reports moderate amounts of urinary leakage. He underwent urodynamic testing in 2018; the bladder volume reached 627mL before he felt the urge to void, but he was unable to voluntarily do so. He denies any erectile dysfunction, changes in his ejaculation, or altered penile sensation. He also denies bowel constipation and incontinence. He underwent MRI Lumbar spine without contrast which showed lumbar spondylosis at multiple levels, but no compressive lesions. He was seen by Neurosurgery who did not think his MRI L-spine was explanatory for his symptoms. They ordered MRI Brain, C/T-spine in late March, the results of which are summarized in the investigations portion of this note.   Review of systems for autonomic function: He denies palpitations or periods of skipped heartbeats; he reports a recent past history of orthostatic lightheadedness resulting in 2 falls in the past 6 weeks which has improved after stopping Flomax; denies skin flushing; denies decreased or excessive sweating but notes that he has had more than average sweating throughout his whole life; he has bilateral lower extremity hair loss for the past 1-2 years, but no hair loss in his groin or on  "his back; no skin changes including atrophy or pigmentation changes; no temperature changes in his limbs; denies constitutional symptoms including weight loss    ASSESSMENT AND PLAN:  Mr. Neri is a 57-year-old male presenting for urinary retention with raised volume threshold for urge and a reduced pain sensation at these high volumes. He also has associated loss of hair in his legs for the past 1-2 years. His ESR, TSH, and ELP were all normal. The combination of urinary dysfunction and hair loss in his extremities is suggestive of a small fiber neuropathy. Given the relatively clean review of systems, our suspicion for a systemic neuropathic process is extremely low. This would be more consistent with a localized lesion distal to the lumbar spine, perhaps in the pelvis. Alternatively, this might be related to idiopathic age-related changes in bladder function, as is the working hypothesis of his Urologist. We offered MRI imaging of his pelvis with and without contrast to look for any inflammatory or degenerative changes, as well as offering in EMG/NCS for large fiber neuropathy. At this point, he declined these studies but will consider them and will contact us when he is interested in pursuing them.    Patient was seen and discussed with Dr. Graham.    Jairo Choi MD  Neurology PGY3  [facility]  Pager: [phone]    PHYSICAL EXAMINATION:  Vitals: /69 (BP Location: Right arm, Patient Position: Chair, Cuff Size: Adult Regular)  Pulse 56  Ht 1.778 m (5' 10\")  Wt 93.4 kg (205 lb 14.6 oz)  BMI 29.54 kg/m2  General: Well-developed male, appears stated age, no acute distress  HEENT: Normocephalic, normal lacrimation and moist mucosa  Cardiac: Normotensive, normal sinus rhythm, slightly bradycardic  Pulmonary: Nonlabored on room air  Abdomen: Nondistended  Extremities: No pitting edema, warm to palpation  Skin: Bilateral loss of hair in his legs distal and proximal with a small " patch of preservative hair around his knees, no rashes, no skin atrophy, no skin hypertrophy or hypopigmentation, no areas of anhidrosis or excessive sweating, no abnormal flushing  Psych: Cooperative, appropriate mood and affect  Neuro:   Mental status: alert and oriented to person, place, and time; language is fluent with intact comprehension and naming   Cranial nerves: PERRL, EOMI with intact smooth pursuit, full visual fields, fundi shows no papilledema but has short arcuate dark pigmentation in the temporal aspects of his discs, no facial asymmetry or ptosis, facial sensation intact and symmetric, hearing intact to conversation, tongue and uvula are midline, no hypophonia, no dysarthria   Motor: No abnormal posture, tone, atrophy, or movements/fasciculations.   RIGHT LEFT    5 5   FDI 5 5   2nd/3rd digit flexion 5 5   2nd/3rd digit extension 5 5   4th/5th digit flexion 5 5   4th/5th digit extension 5 5   Biceps 5 5   Triceps 5 5   Deltoid 5 5   Hip flexion 5 5   Knee extension 5 5   Knee flexion 5 5   Dorsiflexion 5 5   Plantar flexion 5 5    Reflexes: absent Babinski.   RIGHT LEFT   Brachioradialis 2+ 2+   Biceps 2+ 2+   Triceps 2+ 2+   Patellar 2+ 2+   Achilles 2+ 2+    Sensory: Intact to light touch, pin prick, and proprioception in all extremities without extinction. Romberg exam within normal limits. Normal pull test.   Coordination: No dysmetria. No dysdiadochokinesia. No ataxia.   Gait: Normal width, stride length, turn, and arm swing.    INVESTIGATIONS:  ESR = 9  ELP = normal  TSH = 2.21  B12 = 695  PSA = 0.27    MRI Brain/Cervical/Thoracic: reports but not images were personally reviewed. There is no intrinsic cord pathology in the cervical or thoracic spine. His cervical spine showed multilevel degenerative changes with reversal of the normal cervical lordosis, with severe left foraminal stenosis at C3-4 and moderate right foraminal stenosis at C5-6. His thoracic spine showed multilevel disc  degeneration without foraminal stenoses. His cerebral imaging was normal, but notable for derangement of his temporomandibular joints.    REVIEW OF SYSTEMS: 12-point RoS negative except as per HPI.    ALLERGIES:  No Known Allergies    MEDICATIONS:  Current Outpatient Prescriptions   Medication Sig Dispense Refill     Ibuprofen (ADVIL PO) Take 200 mg by mouth as needed for moderate pain         PAST MEDICAL HISTORY:  Past Medical History:   Diagnosis Date     Anxiety state, unspecified      Mumps      Other and unspecified hyperlipidemia        PAST SURGICAL HISTORY:  Past Surgical History:   Procedure Laterality Date     APPENDECTOMY      1983     VASECTOMY  1997       FAMILY HISTORY:  Family History   Problem Relation Age of Onset     Lymphoma Sister      Family History Negative No family hx of      Cancer - colorectal No family hx of      Neuropathy No family hx of        SOCIAL HISTORY:  Works as a high-level executive for a national company. Frequently flies to [place] for business. He does not smoke. He drinks socially.  Social History     Social History     Marital status:      Spouse name: N/A     Number of children: N/A     Years of education: N/A     Occupational History     Not on file.     Social History Main Topics     Smoking status: Never Smoker     Smokeless tobacco: Never Used     Alcohol use Yes      Comment: 4 beers a week     Drug use: No     Sexual activity: Yes     Other Topics Concern     Parent/Sibling W/ Cabg, Mi Or Angioplasty Before 65f 55m? No     Social History Narrative     I saw the patient with Dr. Choi and agree with the pertinent history, findings and plan. Eduard Graham MD

## 2018-05-11 ENCOUNTER — TELEPHONE (OUTPATIENT)
Dept: NEUROLOGY | Facility: CLINIC | Age: 58
End: 2018-05-11

## 2018-05-11 NOTE — TELEPHONE ENCOUNTER
[facility] Call Center    Phone Message    May a detailed message be left on voicemail: yes    Reason for Call: Order(s): Other  Reason for requested: MRI  Date needed: asap - please send order to [facility] on [facility] in [place], [place] per pt request. Pt would like to complete MRI there and go over results w/Dr Choi at upcoming appointment on 6/7.  Provider name: Dr Choi      Action Taken: Message routed to:  [facility] Neurology

## 2018-05-11 NOTE — TELEPHONE ENCOUNTER
[facility] Call Center    Phone Message    May a detailed message be left on voicemail: yes    Reason for Call: Other: Pt stated he was supposed to go over the results of 3 past MRIs (done on 3/29/18 at [facility], ordered by Dr Elias) at his previous appointment on 5/10. The care team was not able to locate the MRIs at the time of the appointment per pt. Pt is requesting to speak to Dr Choi or even a radiologist to interpret them; states he would also be comfortable if he received an opinion on the MRIs via Pocketbook.      Action Taken: Message routed to:  [facility] Neurology

## 2018-05-21 NOTE — TELEPHONE ENCOUNTER
Returned a call to Ed and left a voice message explaining that Dr. Choi will have to place orders for the MRI that he is requesting and that I have sent two message's Dr. Choi asking him to place these orders.  I will fax the orders to [facility] as soon as they are entered as requested by the patient.

## 2018-05-21 NOTE — TELEPHONE ENCOUNTER
Another message sent to Dr. Choi asking him to place orders per the patient's request. (Routing comment)                        You   You 6 days ago                    Message sent to Dr. Choi asking him to place MRI orders.  Will fax to [facility] once placed. (Routing comment)

## 2018-05-21 NOTE — TELEPHONE ENCOUNTER
[facility] Call Center    Phone Message    May a detailed message be left on voicemail: yes    Reason for Call: Order(s): Other:   Reason for requested: MRI - Pt requesting an Open MRI Order for front abdomen area (Please see chart notes from last Dr visit on 05/10/18) at [facility] - said Dr Choi was supposed to get that over to [facility] in [place],  # [phone], Fax # [phone]. [facility] has not received that Order yet. Please send ASAP. Pt has Appt here again with Dr Choi on 06/07/18 so needs to get MRI asap so results can be reviewed at that Appt.  Date needed: ASAP  Provider name: Dr Choi      Action Taken: Message routed to:  [facility] Neurology

## 2018-05-29 ENCOUNTER — TELEPHONE (OUTPATIENT)
Dept: NEUROLOGY | Facility: CLINIC | Age: 58
End: 2018-05-29

## 2018-05-29 NOTE — TELEPHONE ENCOUNTER
[facility] Call Center    Phone Message    May a detailed message be left on voicemail: no    Reason for Call: Order(s): Other:   Reason for requested: Pt requesting an Open MRI Order for front abdomen area (Please see chart notes from last Dr visit on 05/10/18) at [facility] - said Dr Choi was supposed to get that over to [facility] in [facility],  # [phone], Fax # [phone]. [facility] has not received that Order yet. Please send ASAP. Pt changed apt with Dr. Choi to 6/27 due to no orders and pt wanting to see him after the MRI is completed. Pt is asking for a call back with status update.  Date needed: as soon as possible.   Provider name: Dr. Choi    Other: Please call pt back with status.      Action Taken: Message routed to:  [facility]  Neurology

## 2018-05-30 NOTE — TELEPHONE ENCOUNTER
Three messages have been sent to Dr. Choi asking him to place MRI orders.  I am still waiting to hear back from him.  He is in clinic tomorrow so I will have one of my co-workers talk to him about placing these orders.

## 2018-06-03 DIAGNOSIS — R33.9 URINARY RETENTION WITH INCOMPLETE BLADDER EMPTYING: Primary | ICD-10-CM

## 2018-06-04 ENCOUNTER — CARE COORDINATION (OUTPATIENT)
Dept: NEUROLOGY | Facility: CLINIC | Age: 58
End: 2018-06-04

## 2018-06-04 NOTE — PROGRESS NOTES
MRI orders placed by Dr. Choi and faxed to [facility] as requested by the patient.  [phone].  Patient notified

## 2018-06-21 ENCOUNTER — MEDICAL CORRESPONDENCE (OUTPATIENT)
Dept: HEALTH INFORMATION MANAGEMENT | Facility: CLINIC | Age: 58
End: 2018-06-21

## 2018-06-27 ENCOUNTER — TELEPHONE (OUTPATIENT)
Dept: NEUROLOGY | Facility: CLINIC | Age: 58
End: 2018-06-27

## 2018-06-27 NOTE — TELEPHONE ENCOUNTER
Talked with patient to tell him that I can NOT release [facility] MRI results that he requested on [facility]. I asked if I could mail them to him, which is the most secure way. I confirmed his address, and he corrected the address (put in there wrong) so I corrected it from [address] to [address] In [facility]. I put the letter to be mailed at 1208pm on 6/27/2018

## 2018-10-08 ENCOUNTER — E-VISIT (OUTPATIENT)
Dept: FAMILY MEDICINE | Facility: CLINIC | Age: 58
End: 2018-10-08
Payer: COMMERCIAL

## 2018-10-08 ENCOUNTER — TELEPHONE (OUTPATIENT)
Dept: FAMILY MEDICINE | Facility: CLINIC | Age: 58
End: 2018-10-08

## 2018-10-08 DIAGNOSIS — N39.0 ACUTE UTI (URINARY TRACT INFECTION): Primary | ICD-10-CM

## 2018-10-08 PROCEDURE — 99444 ZZC PHYSICIAN ONLINE EVALUATION & MANAGEMENT SERVICE: CPT | Performed by: FAMILY MEDICINE

## 2018-10-08 RX ORDER — SULFAMETHOXAZOLE/TRIMETHOPRIM 800-160 MG
1 TABLET ORAL 2 TIMES DAILY
Qty: 14 TABLET | Refills: 0 | Status: SHIPPED | OUTPATIENT
Start: 2018-10-08 | End: 2019-02-06

## 2018-10-08 NOTE — PATIENT INSTRUCTIONS
Thank you for choosing us for your care. I have placed an order for a prescription so that you can start treatment. Ideally we would have you leave a urine sample here first so we can culture it and make sure the right antibiotic is being used for the right bacteria.  View your full visit summary for details by clicking on the link below. Your pharmacist will able to address any questions you may have about the medication.     If you re not feeling better within 2-3 days, please schedule an appointment.  You can schedule an appointment right here in [employer] or call [phone]  If the visit is for the same symptoms as your e-visit, we ll refund the cost of your e-visit if seen within seven days.    Thank you for choosing us for your care. Given your symptoms, I would like you to do a lab-only visit to determine what is causing them - ideally prior to starting the antibiotic. I have placed the orders.  Please schedule an appointment with the lab right here in [employer] or call [phone].  I will let you know when the results are back and next steps to take.

## 2018-10-08 NOTE — TELEPHONE ENCOUNTER
Geo would be preferred here if he is able to do so and would like to get a urine sample with that as well.

## 2018-10-08 NOTE — MR AVS SNAPSHOT
After Visit Summary   10/8/2018    Bill Neri    MRN: [id number]           Patient Information     Date Of Birth          1960        Visit Information        Provider Department      10/8/2018 1:41 PM Jose Ruiz MD [facility]        Today's Diagnoses     Acute UTI (urinary tract infection)    -  1      Care Instructions      Thank you for choosing us for your care. I have placed an order for a prescription so that you can start treatment. Ideally we would have you leave a urine sample here first so we can culture it and make sure the right antibiotic is being used for the right bacteria.  View your full visit summary for details by clicking on the link below. Your pharmacist will able to address any questions you may have about the medication.     If you re not feeling better within 2-3 days, please schedule an appointment.  You can schedule an appointment right here in [email or web address], or call [phone]  If the visit is for the same symptoms as your e-visit, we ll refund the cost of your e-visit if seen within seven days.    Thank you for choosing us for your care. Given your symptoms, I would like you to do a lab-only visit to determine what is causing them - ideally prior to starting the antibiotic. I have placed the orders.  Please schedule an appointment with the lab right here in [email or web address], or call [phone].  I will let you know when the results are back and next steps to take.          Follow-ups after your visit        Future tests that were ordered for you today     Open Future Orders        Priority Expected Expires Ordered    *UA reflex to Microscopic and Culture Routine  10/8/2019 10/8/2018            Who to contact     If you have questions or need follow up information about today's clinic visit or your schedule please contact [facility] directly at [phone].  Normal or non-critical lab and imaging results will be communicated to you by  MyChart, letter or phone within 4 business days after the clinic has received the results. If you do not hear from us within 7 days, please contact the clinic through [employer] or phone. If you have a critical or abnormal lab result, we will notify you by phone as soon as possible.  Submit refill requests through [employer] or call your pharmacy and they will forward the refill request to us. Please allow 3 business days for your refill to be completed.          Additional Information About Your Visit        [employer] Information     [employer] gives you secure access to your electronic health record. If you see a primary care provider, you can also send messages to your care team and make appointments. If you have questions, please call your primary care clinic.  If you do not have a primary care provider, please call [phone] and they will assist you.        Care EveryWhere ID     This is your Care EveryWhere ID. This could be used by other organizations to access your [place] medical records  [id number]         Blood Pressure from Last 3 Encounters:   05/10/18 108/69   03/26/18 116/72   03/01/18 106/66    Weight from Last 3 Encounters:   05/10/18 205 lb 14.6 oz (93.4 kg)   04/30/18 206 lb (93.4 kg)   03/26/18 206 lb 14.4 oz (93.8 kg)                 Today's Medication Changes          These changes are accurate as of 10/8/18  1:47 PM.  If you have any questions, ask your nurse or doctor.               Start taking these medicines.        Dose/Directions    sulfamethoxazole-trimethoprim 800-160 MG per tablet   Commonly known as:  BACTRIM DS/SEPTRA DS   Used for:  Acute UTI (urinary tract infection)   Started by:  Jose Ruiz MD        Dose:  1 tablet   Take 1 tablet by mouth 2 times daily for 7 days   Quantity:  14 tablet   Refills:  0            Where to get your medicines      These medications were sent to [place] [address] [place] [facility] [address] [facility]  [address] [facility] [address]     Hours:  Tech issues with their phone system Phone:  [phone]     sulfamethoxazole-trimethoprim 800-160 MG per tablet                Primary Care Provider Office Phone # Fax #    Jose Ruiz [phone] [address]  [facility] [address]        Equal Access to Services     STEPHIE JULIO : Hadii aad ku hadasho Soomaali, waaxda luqadaha, qaybta kaalmada adeegyada, waxay radhain haykennethn adeirina khedwigesh latina . So [facility] [phone].    ATENCIÓN: Si habla español, tiene a clarke disposición servicios gratuitos de asistencia lingüística. Julianame al [phone].    We comply with applicable federal civil rights laws and [place] laws. We do not discriminate on the basis of race, color, national origin, age, disability, sex, sexual orientation, or gender identity.            Thank you!     Thank you for choosing [facility]  for your care. Our goal is always to provide you with excellent care. Hearing back from our patients is one way we can continue to improve our services. Please take a few minutes to complete the written survey that you may receive in the mail after your visit with us. Thank you!             Your Updated Medication List - Protect others around you: Learn how to safely use, store and throw away your medicines at www.disposemymeds.org.          This list is accurate as of 10/8/18  1:47 PM.  Always use your most recent med list.                   Brand Name Dispense Instructions for use Diagnosis    ADVIL PO      Take 200 mg by mouth as needed for moderate pain        sulfamethoxazole-trimethoprim 800-160 MG per tablet    BACTRIM DS/SEPTRA DS    14 tablet    Take 1 tablet by mouth 2 times daily for 7 days    Acute UTI (urinary tract infection)

## 2018-10-08 NOTE — TELEPHONE ENCOUNTER
Patient calling stating he does self cath and has a history of UTI's and has been experiencing symptoms for the last 24 hours of increased burning with urination and urgency.  Denies any fever, flank or pelvic pain, or blood in the urine.  Patient requesting rx for Bactrim as that has worked well for him in the past.  Please advise.  [phone]  [facility] [place].    Tori Pappas RN

## 2018-12-19 ENCOUNTER — MYC MEDICAL ADVICE (OUTPATIENT)
Dept: NURSING | Facility: CLINIC | Age: 58
End: 2018-12-19

## 2018-12-19 ENCOUNTER — TELEPHONE (OUTPATIENT)
Dept: FAMILY MEDICINE | Facility: CLINIC | Age: 58
End: 2018-12-19

## 2018-12-19 NOTE — TELEPHONE ENCOUNTER
"Pt calling states he has \"UTI and wants antibiotic\"     He has HA, strong odor and burning.  He does straight cath 3 X day.     Writer suggested telephone or E visit \"that is still 35 $\"    Pt states he can not afford to come in or do E visit. \" I owe thousands in medical bills already\"    He is wondering if RX can be sent for him. If needed he could do UA but would prefer not too.     Please advise    Avani Campbell RN    "

## 2018-12-19 NOTE — TELEPHONE ENCOUNTER
Recommend clinic visit.  I will not be able to follow a lab done by Evisit or other as I am leaving town.

## 2018-12-20 ENCOUNTER — TELEPHONE (OUTPATIENT)
Dept: FAMILY MEDICINE | Facility: CLINIC | Age: 58
End: 2018-12-20

## 2018-12-20 ENCOUNTER — OFFICE VISIT (OUTPATIENT)
Dept: URGENT CARE | Facility: URGENT CARE | Age: 58
End: 2018-12-20
Payer: COMMERCIAL

## 2018-12-20 VITALS
BODY MASS INDEX: 29.41 KG/M2 | DIASTOLIC BLOOD PRESSURE: 74 MMHG | SYSTOLIC BLOOD PRESSURE: 118 MMHG | TEMPERATURE: 98.4 F | WEIGHT: 205 LBS | HEART RATE: 53 BPM | OXYGEN SATURATION: 97 %

## 2018-12-20 DIAGNOSIS — R82.90 ABNORMAL URINE FINDINGS: ICD-10-CM

## 2018-12-20 DIAGNOSIS — R30.0 DYSURIA: ICD-10-CM

## 2018-12-20 DIAGNOSIS — N30.00 ACUTE CYSTITIS WITHOUT HEMATURIA: Primary | ICD-10-CM

## 2018-12-20 LAB
ALBUMIN UR-MCNC: NEGATIVE MG/DL
APPEARANCE UR: ABNORMAL
BACTERIA #/AREA URNS HPF: ABNORMAL /HPF
BILIRUB UR QL STRIP: NEGATIVE
COLOR UR AUTO: YELLOW
GLUCOSE UR STRIP-MCNC: NEGATIVE MG/DL
HGB UR QL STRIP: ABNORMAL
KETONES UR STRIP-MCNC: NEGATIVE MG/DL
LEUKOCYTE ESTERASE UR QL STRIP: ABNORMAL
NITRATE UR QL: POSITIVE
PH UR STRIP: 6 PH (ref 5–7)
RBC #/AREA URNS AUTO: ABNORMAL /HPF
SOURCE: ABNORMAL
SP GR UR STRIP: 1.02 (ref 1–1.03)
UROBILINOGEN UR STRIP-ACNC: 0.2 EU/DL (ref 0.2–1)
WBC #/AREA URNS AUTO: >100 /HPF

## 2018-12-20 PROCEDURE — 87186 SC STD MICRODIL/AGAR DIL: CPT | Performed by: PHYSICIAN ASSISTANT

## 2018-12-20 PROCEDURE — 99213 OFFICE O/P EST LOW 20 MIN: CPT | Performed by: PHYSICIAN ASSISTANT

## 2018-12-20 PROCEDURE — 87088 URINE BACTERIA CULTURE: CPT | Performed by: PHYSICIAN ASSISTANT

## 2018-12-20 PROCEDURE — 87086 URINE CULTURE/COLONY COUNT: CPT | Performed by: PHYSICIAN ASSISTANT

## 2018-12-20 PROCEDURE — 81001 URINALYSIS AUTO W/SCOPE: CPT | Performed by: PHYSICIAN ASSISTANT

## 2018-12-20 RX ORDER — SULFAMETHOXAZOLE/TRIMETHOPRIM 800-160 MG
1 TABLET ORAL 2 TIMES DAILY
Qty: 14 TABLET | Refills: 0 | Status: SHIPPED | OUTPATIENT
Start: 2018-12-20 | End: 2019-02-06

## 2018-12-20 NOTE — TELEPHONE ENCOUNTER
Pt calling he just landed and having worsening symptoms     He will be seen in UC today     Avani Campbell RN

## 2018-12-20 NOTE — PROGRESS NOTES
SUBJECTIVE:   Bill Neri is a 58 year old male presenting with a chief complaint of   Chief Complaint   Patient presents with     Urgent Care     UTI       He is an established patient of [facility].    UTI    Onset of symptoms was 1week(s) ago.  Course of illness is worsening  Severity moderate  Current and associated symptoms dysuria and burning  Treatment and measures tried Increase fluid intake  Predisposing factors include self catheterization 3 times daily. Has neurogenic bladder.  Patient denies rigors, flank pain, temperature > 101 degrees F., vomiting and taking Coumadin        Review of Systems   Constitutional: Negative for chills and fever.   Gastrointestinal: Negative for diarrhea, nausea and vomiting.   Genitourinary: Positive for dysuria. Negative for flank pain, hematuria and urgency.       Past Medical History:   Diagnosis Date     Anxiety state, unspecified      Mumps      Other and unspecified hyperlipidemia      Family History   Problem Relation Age of Onset     Lymphoma Sister      Family History Negative No family hx of      Cancer - colorectal No family hx of      Neuropathy No family hx of      Current Outpatient Medications   Medication Sig Dispense Refill     Ibuprofen (ADVIL PO) Take 200 mg by mouth as needed for moderate pain       sulfamethoxazole-trimethoprim (BACTRIM DS/SEPTRA DS) 800-160 MG tablet Take 1 tablet by mouth 2 times daily for 7 days 14 tablet 0     Social History     Tobacco Use     Smoking status: Never Smoker     Smokeless tobacco: Never Used   Substance Use Topics     Alcohol use: Yes     Comment: 4 beers a week       OBJECTIVE  /74   Pulse 53   Temp 98.4  F (36.9  C) (Oral)   Wt 93 kg (205 lb)   SpO2 97%   BMI 29.41 kg/m      Physical Exam   Constitutional: He appears well-developed and well-nourished. No distress.   HENT:   Head: Normocephalic and atraumatic.   Mouth/Throat: Oropharynx is clear and moist.   Eyes: Conjunctivae are normal.   Neck: Normal  range of motion.   Cardiovascular: Regular rhythm and normal heart sounds.   Pulmonary/Chest: Effort normal and breath sounds normal. No respiratory distress.   Abdominal: Soft. Bowel sounds are normal. He exhibits no distension. There is no tenderness. There is no rebound and no guarding.   Neurological: He is alert.   Skin: Skin is warm and dry.   Psychiatric: He has a normal mood and affect.   Nursing note and vitals reviewed.      Labs:  Results for orders placed or performed in visit on 12/20/18 (from the past 24 hour(s))   *UA reflex to Microscopic and Culture ([place] and [facility] (except [place] and [place])   Result Value Ref Range    Color Urine Yellow     Appearance Urine Cloudy     Glucose Urine Negative NEG^Negative mg/dL    Bilirubin Urine Negative NEG^Negative    Ketones Urine Negative NEG^Negative mg/dL    Specific Gravity Urine 1.025 1.003 - 1.035    Blood Urine Trace (A) NEG^Negative    pH Urine 6.0 5.0 - 7.0 pH    Protein Albumin Urine Negative NEG^Negative mg/dL    Urobilinogen Urine 0.2 0.2 - 1.0 EU/dL    Nitrite Urine Positive (A) NEG^Negative    Leukocyte Esterase Urine Moderate (A) NEG^Negative    Source Midstream Urine    Urine Microscopic   Result Value Ref Range    WBC Urine >100 (A) OTO5^0 - 5 /HPF    RBC Urine 5-10 (A) OTO2^O - 2 /HPF    Bacteria Urine Moderate (A) NEG^Negative /HPF           ASSESSMENT:      ICD-10-CM    1. Acute cystitis without hematuria N30.00 sulfamethoxazole-trimethoprim (BACTRIM DS/SEPTRA DS) 800-160 MG tablet   2. Dysuria R30.0 *UA reflex to Microscopic and Culture ([place] and [facility] (except [place] and [place])     Urine Microscopic   3. Abnormal urine findings R82.90 Urine Culture Aerobic Bacterial        Medical Decision Making:    Differential Diagnosis:  UTI: UTI and Dysuria    Serious Comorbid Conditions:  Adult:  None    PLAN:    UTI Adult: UA suggestive of infection today. Bactrim Rx. Urine culture is pending. Push fluids. We will  communicate any changes to the antibiotic if need be based on the urine culture result. Follow up if any worsening symptoms. Patient agrees with the plan.       Followup:    If not improving or if condition worsens, follow up with your Primary Care Provider

## 2018-12-20 NOTE — TELEPHONE ENCOUNTER
LM for pt that writer will notify clinic supervisor of his concerns and with symptoms that is he have should be seen in UC or ER    Avani Campbell RN

## 2018-12-20 NOTE — TELEPHONE ENCOUNTER
"Pt called to schedule appointment with PCP for UTI sx, first available is 12/31. Pt had checked into urgent care at 3:00 PM today but states there is at least a 2 hour wait and cannot wait that long as he has work meetings.     Pt states he has a severe UTI and is voiding a lot of blood with severe pain. Refused to speak with RN triage, would not agree to go to UC or ER for treatment. Pt states that he is mad at PCP for not giving him requested abx from 12/19 and that \"if I drop dead before my appointment everything is documented and written out for my wife\" he states he self cath's and feels that the prescription should just be called in because he is at high risk for UTI's.      Beni Schmitz   12/20/18 3:45 PM     "

## 2018-12-21 LAB
BACTERIA SPEC CULT: ABNORMAL
SPECIMEN SOURCE: ABNORMAL

## 2018-12-21 ASSESSMENT — ENCOUNTER SYMPTOMS
VOMITING: 0
NAUSEA: 0
FEVER: 0
DYSURIA: 1
FLANK PAIN: 0
CHILLS: 0
DIARRHEA: 0
HEMATURIA: 0

## 2019-01-11 ENCOUNTER — TELEPHONE (OUTPATIENT)
Dept: FAMILY MEDICINE | Facility: CLINIC | Age: 59
End: 2019-01-11

## 2019-01-11 NOTE — TELEPHONE ENCOUNTER
"Pt calling with strong odor and \"murky\" look to his urine still from his UC visit on 12/20/18.  He was treated with Bactrim and he believes his symptoms did go away but they have since returned and he would like to be treated again.  Advised pt that he should be seen but he states [place].  Offered Evisit with PCP or [email or web address] and pt declined and does not feel that he needs an appt every time he has an infection.  If things don't get better he will follow up in February when he returns.  He requested this call be documented and informed pt that it would be.    Please advise    Suzie Timmons RN, BSN    "

## 2019-01-14 NOTE — TELEPHONE ENCOUNTER
I have seen patient 1 time since 11/2016.  With that frequency of clinic visit it is most appropriate to have either a clinic visit or at a minimum E-visit with urine sample and urine culture done in my opinion.  Last E- visit we requested a urine sample for urine culture which he did not do.    Please forward this chart for review by clinic manager as they may be able to best explained to him why it is appropriate for us to have clinic visits and appropriate tests done to make best diagnosis and treatment plans.    If he is looking for a long-term plan of self-administered antibiotics for this issue he is best to see his urologist in follow-up to come up with that plan.    If he is still having symptoms we can work him into clinic this week or have him see urgent care.

## 2019-02-06 ENCOUNTER — OFFICE VISIT (OUTPATIENT)
Dept: FAMILY MEDICINE | Facility: CLINIC | Age: 59
End: 2019-02-06
Payer: COMMERCIAL

## 2019-02-06 VITALS
TEMPERATURE: 97.7 F | HEART RATE: 56 BPM | SYSTOLIC BLOOD PRESSURE: 110 MMHG | BODY MASS INDEX: 29.29 KG/M2 | WEIGHT: 204.6 LBS | RESPIRATION RATE: 16 BRPM | HEIGHT: 70 IN | DIASTOLIC BLOOD PRESSURE: 70 MMHG | OXYGEN SATURATION: 97 %

## 2019-02-06 DIAGNOSIS — H69.93 DYSFUNCTION OF BOTH EUSTACHIAN TUBES: Primary | ICD-10-CM

## 2019-02-06 DIAGNOSIS — H91.93 BILATERAL HEARING LOSS, UNSPECIFIED HEARING LOSS TYPE: ICD-10-CM

## 2019-02-06 PROCEDURE — 99213 OFFICE O/P EST LOW 20 MIN: CPT | Performed by: FAMILY MEDICINE

## 2019-02-06 RX ORDER — PSEUDOEPHEDRINE HCL 30 MG
60 TABLET ORAL EVERY 4 HOURS PRN
COMMUNITY
End: 2019-05-24

## 2019-02-06 RX ORDER — FLUTICASONE PROPIONATE 50 MCG
2 SPRAY, SUSPENSION (ML) NASAL DAILY
Qty: 16 G | Refills: 0 | Status: SHIPPED | OUTPATIENT
Start: 2019-02-06 | End: 2019-05-24

## 2019-02-06 ASSESSMENT — MIFFLIN-ST. JEOR: SCORE: 1754.31

## 2019-02-06 NOTE — PROGRESS NOTES
SUBJECTIVE:   Bill Neri is a 58 year old male presenting with a chief complaint of   Chief Complaint   Patient presents with     RECHECK     ear infection        He is an established patient of [facility].    HPI: The patient is a 58-year-old male, with history of persistent, bilateral hearing loss, starting 1 month ago.  Patient was seen at a [place] facility last week, prescribed Augmentin (2 days left) and a Medrol Dosepak for right > left bilateral otitis media.  Records are not available for review, but patient brings in his prescriptions today.      Patient denies history of trauma/injury, ear drainage/bleeding, or ear pain.  He has a continued muffled sensation involving his ears, described as an underwater sensation.  Possible tinnitus involving the left ear.  Patient had nasal congestion and postnasal drainage during the initial 3 weeks of his illness, which he states have improved.  He has been using Sudafed, without significant improvement in his ear symptoms and hearing.  Patient states he does fly on airplanes quite a bit.  No fever, chills, nausea, vomiting, vertigo, chest pain, or shortness of breath.  Patient presents for evaluation, given the persistence of his symptoms.    Review of Systems   No history of glaucoma or epistaxis.    Patient Active Problem List   Diagnosis     Anxiety state     Enlarged prostate with lower urinary tract symptoms (LUTS)     Advanced directives, counseling/discussion     Degeneration, intervertebral disc, thoracolumbar     Anemia     Neurogenic bladder     Spondylosis of thoracolumbar region without myelopathy or radiculopathy     Past Medical History:   Diagnosis Date     Anxiety state, unspecified      Mumps      Other and unspecified hyperlipidemia       No Known Allergies    Family History   Problem Relation Age of Onset     Lymphoma Sister      Family History Negative No family hx of      Cancer - colorectal No family hx of      Neuropathy No family hx  "of      Current Outpatient Medications   Medication Sig Dispense Refill     amoxicillin-clavulanate (AUGMENTIN) 875-125 MG tablet Take 1 tablet by mouth 2 times daily              Ibuprofen (ADVIL PO) Take 200 mg by mouth as needed for moderate pain       pseudoePHEDrine ([facility] NASAL DECONGESTANT) 30 MG tablet Take 60 mg by mouth every 4 hours as needed for congestion       Social History     Tobacco Use     Smoking status: Never Smoker     Smokeless tobacco: Never Used   Substance Use Topics     Alcohol use: Yes     Comment: 4 beers a week     OBJECTIVE:  /70 (BP Location: Right arm, Patient Position: Chair, Cuff Size: Adult Large)   Pulse 56   Temp 97.7  F (36.5  C) (Oral)   Resp 16   Ht 1.778 m (5' 10\")   Wt 92.8 kg (204 lb 9.6 oz)   SpO2 97%   BMI 29.36 kg/m    GENERAL APPEARANCE:  Awake, alert, and in no acute distress.  PSYCHIATRIC:  Pleasant, smiling affect.  HEENT: Wears glasses.  Sclera anicteric.  No conjunctivitis.  PERRLA.  Extraocular movements are intact.  Bilateral TM's and canals are within normal limits, with the exception of subtle fluid.  Cannot rule out a small, 2-3 mm perforation involving the left TM, but no evidence of bleeding or drainage appreciated.  Mild nasal congestion.  Sinuses are not tender to palpation.  No erythema, edema, or exudates of the oral mucosa or posterior pharynx.  Mucous membranes moist.  NECK:  Spontaneous full range of motion.  No thyromegaly or mass.  No lymphadenopathy.  HEART:  Normal S1, S2.  Regular rate and rhythm.  No murmurs, rubs, or gallops.  LUNGS:  No respiratory distress.  No wheezes, rales, or rhonchi.  EXTREMITIES:  Moves 4 extremities.   No edema or calf tenderness.  NEUROLOGIC:  Gait within normal limits.  No facial droop or acute neurologic deficits.  SKIN:  No rash or diaphoresis.     ASSESSMENT:    ICD-10-CM    1. Dysfunction of both eustachian tubes.  See #2 below. H69.83 fluticasone (FLONASE) 50 MCG/ACT nasal spray     OTOLARYNGOLOGY " REFERRAL   2. Bilateral hearing loss, unspecified hearing loss type, post bilateral otitis media 1 week ago.  Patient is currently on day 8 of a 10-day course of Augmentin, post completion of a recent Medrol Dosepak. H91.93 OTOLARYNGOLOGY REFERRAL     PLAN:    Patient Instructions     Complete the Augmentin, as previously prescribed.    Limits Afrin nasal spray to 3 days, as recommended.    Flonase x 2 weeks, as prescribed.    Follow-up if worsening symptoms, as discussed.    Follow-up with ENT in 2 weeks if persistent hearing concerns, as discussed.    Discussed risks and benefits of treatment strategies, as noted in the Assessment and Plan sections.    The patient was discharged ambulatory and in stable condition post discussion of follow up.     Arleth Eckert MD  [facility]

## 2019-02-06 NOTE — PATIENT INSTRUCTIONS
Complete the Augmentin, as previously prescribed.    Limits Afrin nasal spray to 3 days, as recommended.    Flonase x 2 weeks, as prescribed.    Follow-up if worsening symptoms, as discussed.    Follow-up with ENT in 2 weeks if persistent hearing concerns, as discussed.

## 2019-02-21 ENCOUNTER — TRANSFERRED RECORDS (OUTPATIENT)
Dept: HEALTH INFORMATION MANAGEMENT | Facility: CLINIC | Age: 59
End: 2019-02-21

## 2019-04-30 ENCOUNTER — MEDICAL CORRESPONDENCE (OUTPATIENT)
Dept: HEALTH INFORMATION MANAGEMENT | Facility: CLINIC | Age: 59
End: 2019-04-30

## 2019-05-24 ENCOUNTER — ANCILLARY PROCEDURE (OUTPATIENT)
Dept: GENERAL RADIOLOGY | Facility: CLINIC | Age: 59
End: 2019-05-24
Attending: FAMILY MEDICINE
Payer: COMMERCIAL

## 2019-05-24 ENCOUNTER — OFFICE VISIT (OUTPATIENT)
Dept: FAMILY MEDICINE | Facility: CLINIC | Age: 59
End: 2019-05-24
Payer: COMMERCIAL

## 2019-05-24 VITALS
RESPIRATION RATE: 17 BRPM | BODY MASS INDEX: 28.53 KG/M2 | SYSTOLIC BLOOD PRESSURE: 120 MMHG | WEIGHT: 199.3 LBS | OXYGEN SATURATION: 97 % | HEIGHT: 70 IN | HEART RATE: 63 BPM | DIASTOLIC BLOOD PRESSURE: 64 MMHG | TEMPERATURE: 97.5 F

## 2019-05-24 DIAGNOSIS — D64.9 ANEMIA, UNSPECIFIED TYPE: ICD-10-CM

## 2019-05-24 DIAGNOSIS — Z00.00 ROUTINE GENERAL MEDICAL EXAMINATION AT A HEALTH CARE FACILITY: Primary | ICD-10-CM

## 2019-05-24 DIAGNOSIS — G57.12 LATERAL FEMORAL CUTANEOUS ENTRAPMENT SYNDROME, LEFT: ICD-10-CM

## 2019-05-24 DIAGNOSIS — N39.0 RECURRENT UTI (URINARY TRACT INFECTION): ICD-10-CM

## 2019-05-24 DIAGNOSIS — D50.9 IRON DEFICIENCY ANEMIA, UNSPECIFIED IRON DEFICIENCY ANEMIA TYPE: ICD-10-CM

## 2019-05-24 DIAGNOSIS — N30.01 ACUTE CYSTITIS WITH HEMATURIA: ICD-10-CM

## 2019-05-24 DIAGNOSIS — Z11.59 NEED FOR HEPATITIS C SCREENING TEST: ICD-10-CM

## 2019-05-24 DIAGNOSIS — R05.9 COUGH: ICD-10-CM

## 2019-05-24 DIAGNOSIS — N31.9 NEUROGENIC BLADDER: ICD-10-CM

## 2019-05-24 DIAGNOSIS — Z12.11 COLON CANCER SCREENING: ICD-10-CM

## 2019-05-24 LAB
ALBUMIN SERPL-MCNC: 3.9 G/DL (ref 3.4–5)
ALP SERPL-CCNC: 96 U/L (ref 40–150)
ALT SERPL W P-5'-P-CCNC: 31 U/L (ref 0–70)
ANION GAP SERPL CALCULATED.3IONS-SCNC: 7 MMOL/L (ref 3–14)
AST SERPL W P-5'-P-CCNC: 24 U/L (ref 0–45)
BILIRUB SERPL-MCNC: 0.8 MG/DL (ref 0.2–1.3)
BUN SERPL-MCNC: 20 MG/DL (ref 7–30)
CALCIUM SERPL-MCNC: 10 MG/DL (ref 8.5–10.1)
CHLORIDE SERPL-SCNC: 106 MMOL/L (ref 94–109)
CHOLEST SERPL-MCNC: 205 MG/DL
CO2 SERPL-SCNC: 28 MMOL/L (ref 20–32)
CREAT SERPL-MCNC: 1.08 MG/DL (ref 0.66–1.25)
ERYTHROCYTE [DISTWIDTH] IN BLOOD BY AUTOMATED COUNT: 15.6 % (ref 10–15)
GFR SERPL CREATININE-BSD FRML MDRD: 75 ML/MIN/{1.73_M2}
GLUCOSE SERPL-MCNC: 90 MG/DL (ref 70–99)
HCT VFR BLD AUTO: 39.3 % (ref 40–53)
HCV AB SERPL QL IA: NONREACTIVE
HDLC SERPL-MCNC: 43 MG/DL
HGB BLD-MCNC: 12.6 G/DL (ref 13.3–17.7)
LDLC SERPL CALC-MCNC: 135 MG/DL
MCH RBC QN AUTO: 27 PG (ref 26.5–33)
MCHC RBC AUTO-ENTMCNC: 32.1 G/DL (ref 31.5–36.5)
MCV RBC AUTO: 84 FL (ref 78–100)
NONHDLC SERPL-MCNC: 162 MG/DL
PLATELET # BLD AUTO: 204 10E9/L (ref 150–450)
POTASSIUM SERPL-SCNC: 4.4 MMOL/L (ref 3.4–5.3)
PROT SERPL-MCNC: 7.5 G/DL (ref 6.8–8.8)
RBC # BLD AUTO: 4.67 10E12/L (ref 4.4–5.9)
SODIUM SERPL-SCNC: 141 MMOL/L (ref 133–144)
TRIGL SERPL-MCNC: 137 MG/DL
WBC # BLD AUTO: 6.3 10E9/L (ref 4–11)

## 2019-05-24 PROCEDURE — 90471 IMMUNIZATION ADMIN: CPT | Performed by: FAMILY MEDICINE

## 2019-05-24 PROCEDURE — 83550 IRON BINDING TEST: CPT | Performed by: FAMILY MEDICINE

## 2019-05-24 PROCEDURE — 80053 COMPREHEN METABOLIC PANEL: CPT | Performed by: FAMILY MEDICINE

## 2019-05-24 PROCEDURE — 83540 ASSAY OF IRON: CPT | Performed by: FAMILY MEDICINE

## 2019-05-24 PROCEDURE — 36415 COLL VENOUS BLD VENIPUNCTURE: CPT | Performed by: FAMILY MEDICINE

## 2019-05-24 PROCEDURE — 71046 X-RAY EXAM CHEST 2 VIEWS: CPT

## 2019-05-24 PROCEDURE — 86803 HEPATITIS C AB TEST: CPT | Performed by: FAMILY MEDICINE

## 2019-05-24 PROCEDURE — 85045 AUTOMATED RETICULOCYTE COUNT: CPT | Performed by: FAMILY MEDICINE

## 2019-05-24 PROCEDURE — 90715 TDAP VACCINE 7 YRS/> IM: CPT | Performed by: FAMILY MEDICINE

## 2019-05-24 PROCEDURE — 82728 ASSAY OF FERRITIN: CPT | Performed by: FAMILY MEDICINE

## 2019-05-24 PROCEDURE — 99396 PREV VISIT EST AGE 40-64: CPT | Mod: 25 | Performed by: FAMILY MEDICINE

## 2019-05-24 PROCEDURE — 85027 COMPLETE CBC AUTOMATED: CPT | Performed by: FAMILY MEDICINE

## 2019-05-24 PROCEDURE — 80061 LIPID PANEL: CPT | Performed by: FAMILY MEDICINE

## 2019-05-24 RX ORDER — SULFAMETHOXAZOLE/TRIMETHOPRIM 800-160 MG
1 TABLET ORAL 2 TIMES DAILY
Qty: 14 TABLET | Refills: 0
Start: 2019-05-24 | End: 2021-01-29

## 2019-05-24 RX ORDER — SULFAMETHOXAZOLE/TRIMETHOPRIM 800-160 MG
1 TABLET ORAL 2 TIMES DAILY
Qty: 56 TABLET | Refills: 0 | Status: SHIPPED | OUTPATIENT
Start: 2019-05-24 | End: 2019-05-24

## 2019-05-24 ASSESSMENT — ENCOUNTER SYMPTOMS
ARTHRALGIAS: 1
SORE THROAT: 0
HEARTBURN: 0
ABDOMINAL PAIN: 0
NERVOUS/ANXIOUS: 0
WEAKNESS: 0
EYE PAIN: 0
HEADACHES: 0
CONSTIPATION: 0
JOINT SWELLING: 0
HEMATOCHEZIA: 0
CHILLS: 0
DYSURIA: 0
COUGH: 1
DIZZINESS: 1
PALPITATIONS: 0
HEMATURIA: 0
FREQUENCY: 0
DIARRHEA: 0
MYALGIAS: 0
PARESTHESIAS: 0
NAUSEA: 0
FEVER: 0
SHORTNESS OF BREATH: 0

## 2019-05-24 ASSESSMENT — MIFFLIN-ST. JEOR: SCORE: 1725.27

## 2019-05-24 NOTE — PROGRESS NOTES
SUBJECTIVE:   CC: Bill Neri is an 59 year old male who presents for preventative health visit.     Healthy Habits:     Getting at least 3 servings of Calcium per day:  Yes    Bi-annual eye exam:  NO    Dental care twice a year:  Yes    Sleep apnea or symptoms of sleep apnea:  None    Diet:  Regular (no restrictions)    Frequency of exercise:  6-7 days/week    Duration of exercise:  Greater than 60 minutes    Taking medications regularly:  Yes    Medication side effects:  None    PHQ-2 Total Score: 0    Additional concerns today:  Yes    Patient reports a cough for the past five or six months. The cough began in December with a severe upper respiratory infection. Denies Hx of asthma.     Patient status post tympanostomy with PE tube placement in 2/2019 due to bilateral chronic serous otitis media. Follow[facility].     History of neurogenic bladder with urinary retention. Self catheterizing three times per day. Patient reports severe curvature with erections, which started three months after injuring himself with the catheter. Followed by urology and neurology.     Today's PHQ-2 Score:   PHQ-2 ( 1999 [employer]) 5/24/2019   Q1: Little interest or pleasure in doing things 0   Q2: Feeling down, depressed or hopeless 0   PHQ-2 Score 0   Q1: Little interest or pleasure in doing things Not at all   Q2: Feeling down, depressed or hopeless Not at all   PHQ-2 Score 0     Abuse: Current or Past(Physical, Sexual or Emotional)- No  Do you feel safe in your environment? Yes    Social History     Tobacco Use     Smoking status: Never Smoker     Smokeless tobacco: Never Used   Substance Use Topics     Alcohol use: Yes     Alcohol/week: 3.0 - 3.6 oz     Types: 5 - 6 Standard drinks or equivalent per week     Alcohol Use 5/24/2019   Prescreen: >3 drinks/day or >7 drinks/week? Yes   Prescreen: >3 drinks/day or >7 drinks/week? -   AUDIT SCORE  3     AUDIT - Alcohol Use Disorders Identification Test - Reproduced from  the World Health Organization Audit 2001 (Second Edition) 5/24/2019   1.  How often do you have a drink containing alcohol? 2 to 3 times a week   2.  How many drinks containing alcohol do you have on a typical day when you are drinking? 1 or 2   3.  How often do you have five or more drinks on one occasion? Never   4.  How often during the last year have you found that you were not able to stop drinking once you had started? Never   5.  How often during the last year have you failed to do what was normally expected of you because of drinking? Never   6.  How often during the last year have you needed a first drink in the morning to get yourself going after a heavy drinking session? Never   7.  How often during the last year have you had a feeling of guilt or remorse after drinking? Never   8.  How often during the last year have you been unable to remember what happened the night before because of your drinking? Never   9.  Have you or someone else been injured because of your drinking? No   10. Has a relative, friend, doctor or other health care worker been concerned about your drinking or suggested you cut down? No   TOTAL SCORE 3     Last PSA:   PSA   Date Value Ref Range Status   02/20/2015 0.95 0 - 4 ug/L Final     Reviewed orders with patient. Reviewed health maintenance and updated orders accordingly - Yes  Patient Active Problem List   Diagnosis     Anxiety state     Enlarged prostate with lower urinary tract symptoms (LUTS)     Advanced directives, counseling/discussion     Degeneration, intervertebral disc, thoracolumbar     Anemia     Neurogenic bladder     Spondylosis of thoracolumbar region without myelopathy or radiculopathy     Past Surgical History:   Procedure Laterality Date     APPENDECTOMY      1983     VASECTOMY  1997       Social History     Tobacco Use     Smoking status: Never Smoker     Smokeless tobacco: Never Used   Substance Use Topics     Alcohol use: Yes     Alcohol/week: 3.0 - 3.6 oz      Types: 5 - 6 Standard drinks or equivalent per week     Family History   Problem Relation Age of Onset     Cancer Father         Oral     Dementia Father      Lymphoma Sister      Family History Negative No family hx of      Cancer - colorectal No family hx of      Neuropathy No family hx of          Reviewed and updated as needed this visit by clinical staff  Tobacco  Allergies  Meds  Med Hx  Surg Hx  Fam Hx  Soc Hx        Reviewed and updated as needed this visit by Provider        Past Medical History:   Diagnosis Date     Anxiety state, unspecified      Mumps      Other and unspecified hyperlipidemia       Past Surgical History:   Procedure Laterality Date     APPENDECTOMY      1983     VASECTOMY  1997     Review of Systems   Constitutional: Negative for chills and fever.   HENT: Positive for congestion. Negative for ear pain, hearing loss and sore throat.    Eyes: Negative for pain and visual disturbance.   Respiratory: Positive for cough. Negative for shortness of breath.    Cardiovascular: Negative for chest pain, palpitations and peripheral edema.   Gastrointestinal: Negative for abdominal pain, constipation, diarrhea, heartburn, hematochezia and nausea.   Genitourinary: Positive for impotence. Negative for discharge, dysuria, frequency, genital sores, hematuria and urgency.   Musculoskeletal: Positive for arthralgias. Negative for joint swelling and myalgias.   Skin: Negative for rash.   Neurological: Positive for dizziness. Negative for weakness, headaches and paresthesias.   Psychiatric/Behavioral: Negative for mood changes. The patient is not nervous/anxious.      This document serves as a record of the services and decisions personally performed and made by Jose Ruiz MD. It was created on his behalf by Sis Hale, a trained medical scribe. The creation of this document is based on the provider's statements to the medical scribe.  Sis Hale 7:47 AM May 24, 2019    OBJECTIVE:   BP  "120/64 (BP Location: Right arm, Patient Position: Chair, Cuff Size: Adult Regular)   Pulse 63   Temp 97.5  F (36.4  C) (Oral)   Resp 17   Ht 1.778 m (5' 10\")   Wt 90.4 kg (199 lb 4.8 oz)   SpO2 97%   BMI 28.60 kg/m      Physical Exam  GENERAL: healthy, alert and no distress  EYES: Eyes grossly normal to inspection, PERRL and conjunctivae and sclerae normal  HENT: ear canals and TM's normal, nose and mouth without ulcers or lesions  NECK: no adenopathy, no asymmetry, masses, or scars and thyroid normal to palpation  RESP: lungs clear to auscultation - no rales, rhonchi or wheezes  CV: regular rate and rhythm, normal S1 S2, no S3 or S4, no murmur, click or rub, no peripheral edema and peripheral pulses strong  ABDOMEN: soft, nontender, no hepatosplenomegaly, no masses and bowel sounds normal   (male): small reducible left inguinal hernia, normal male genitalia without lesions or urethral discharge  MS: no gross musculoskeletal defects noted, no edema  SKIN: no suspicious lesions or rashes  NEURO: Normal strength and tone, mentation intact and speech normal  PSYCH: mentation appears normal, affect normal/bright    X-ray chest ordered and radiology reading pending    Diagnostic Test Results:  Labs reviewed in Epic  No results found for this or any previous visit (from the past 24 hour(s)).    ASSESSMENT/PLAN:   1. Routine general medical examination at a health care facility  - Glucose  - Lipid panel reflex to direct LDL Fasting  - Hepatitis C Screen Reflex to HCV RNA Quant and Genotype    2. Lateral femoral cutaneous entrapment syndrome, left  This is likely not related to bladder issue with typical symptoms of meralgia paresthetica but consider pelvis MRI if changes.    3. Neurogenic bladder  Continue urology follow-up.  Patient has had trauma with catheter and complains of what sounds like Peyronie's disease which we talked about follow-up with urology as well.    4. Acute cystitis with hematuria  - " "sulfamethoxazole-trimethoprim (BACTRIM DS/SEPTRA DS) 800-160 MG tablet; Take 1 tablet by mouth 2 times daily for 7 days  Dispense: 56 tablet; Refill: 0    5. Recurrent UTI    6. Need for hepatitis C screening test    7. Cough  Appears to be postnasal drainage and may be allergy related.  Recommend chest x-ray with length of cough.  - XR Chest 2 Views    COUNSELING:   Reviewed preventive health counseling, as reflected in patient instructions  Special attention given to:        Regular exercise       Healthy diet/nutrition       Immunizations    Vaccinated for: TDAP         Consider Hep C screening for patients born between 1945 and        HIV screeninx in teen years, 1x in adult years, and at intervals if high risk    Estimated body mass index is 28.6 kg/m  as calculated from the following:    Height as of this encounter: 1.778 m (5' 10\").    Weight as of this encounter: 90.4 kg (199 lb 4.8 oz).     Weight management plan: Discussed healthy diet and exercise guidelines     reports that he has never smoked. He has never used smokeless tobacco.      Counseling Resources:  ATP IV Guidelines  Pooled Cohorts Equation Calculator  FRAX Risk Assessment  ICSI Preventive Guidelines  Dietary Guidelines for Americans,   USDA's MyPlate  ASA Prophylaxis  Lung CA Screening    The information in this document, created by the medical scribe for me, accurately reflects the services I personally performed and the decisions made by me. I have reviewed and approved this document for accuracy prior to leaving the patient care area.  May 24, 2019 8:22 AM    Jose Ruiz MD  [facility]  "

## 2019-05-24 NOTE — NURSING NOTE
Screening Questionnaire for Adult Immunization    Are you sick today?   No   Do you have allergies to medications, food, a vaccine component or latex?   No   Have you ever had a serious reaction after receiving a vaccination?   No   Do you have a long-term health problem with heart disease, lung disease, asthma, kidney disease, metabolic disease (e.g. diabetes), anemia, or other blood disorder?   No   Do you have cancer, leukemia, HIV/AIDS, or any other immune system problem?   No   In the past 3 months, have you taken medications that affect  your immune system, such as prednisone, other steroids, or anticancer drugs; drugs for the treatment of rheumatoid arthritis, Crohn s disease, or psoriasis; or have you had radiation treatments?   No   Have you had a seizure, or a brain or other nervous system problem?   No   During the past year, have you received a transfusion of blood or blood     products, or been given immune (gamma) globulin or antiviral drug?   No   For women: Are you pregnant or is there a chance you could become        pregnant during the next month?   No   Have you received any vaccinations in the past 4 weeks?   No     Patient instructed to remain in clinic for 20 minutes afterwards, and to report any adverse reaction to me immediately.       Screening performed by Pati Zimmer CMA    Prior to injection verified patient identity using patient's name and date of birth.  Per orders of Dr. Ruiz, injection of tdap given by Pati Zimmer CMA. Patient instructed to remain in clinic for 20 minutes afterwards, and to report any adverse reaction to me immediately.    Vaccine information supplied.

## 2019-05-25 LAB
FERRITIN SERPL-MCNC: 4 NG/ML (ref 26–388)
IRON SATN MFR SERPL: 10 % (ref 15–46)
IRON SERPL-MCNC: 51 UG/DL (ref 35–180)
TIBC SERPL-MCNC: 489 UG/DL (ref 240–430)

## 2019-05-26 LAB
RETICS # AUTO: 41.8 10E9/L (ref 25–95)
RETICS/RBC NFR AUTO: 0.9 % (ref 0.5–2)

## 2019-06-04 ENCOUNTER — TELEPHONE (OUTPATIENT)
Dept: FAMILY MEDICINE | Facility: CLINIC | Age: 59
End: 2019-06-04

## 2019-06-04 DIAGNOSIS — D50.9 IRON DEFICIENCY ANEMIA, UNSPECIFIED IRON DEFICIENCY ANEMIA TYPE: Primary | ICD-10-CM

## 2019-06-04 RX ORDER — FERROUS SULFATE 325(65) MG
325 TABLET ORAL
Qty: 90 TABLET | Refills: 0 | Status: SHIPPED | OUTPATIENT
Start: 2019-06-04

## 2019-06-04 NOTE — TELEPHONE ENCOUNTER
Pt notified of lab results-  He will start iron today and take with Vit C.  Will increase fiber and work to increase iron rich foods in diet.     Will schedule f/u lab when he picks up FIT test.    Please place lab for f/u -  CBC or just HGB     Avani Campbell RN

## 2019-06-09 PROCEDURE — 82274 ASSAY TEST FOR BLOOD FECAL: CPT | Performed by: FAMILY MEDICINE

## 2019-06-13 DIAGNOSIS — Z12.11 COLON CANCER SCREENING: ICD-10-CM

## 2019-06-13 DIAGNOSIS — D64.9 ANEMIA, UNSPECIFIED TYPE: ICD-10-CM

## 2019-06-13 LAB — HEMOCCULT STL QL IA: NEGATIVE

## 2019-08-02 DIAGNOSIS — D64.9 ANEMIA, UNSPECIFIED TYPE: ICD-10-CM

## 2019-08-02 RX ORDER — FERROUS SULFATE 325(65) MG
TABLET ORAL
Qty: 30 TABLET | Refills: 2 | OUTPATIENT
Start: 2019-08-02

## 2019-08-02 NOTE — TELEPHONE ENCOUNTER
"Pt is only taking iron OTC     He will f/u with PCP in a couple month in clinic and he donated blood a couple weeks ago and hgb was 14.6.     \"I am feeling really good\"    Avani Campbell RN    "

## 2019-08-02 NOTE — TELEPHONE ENCOUNTER
"Requested Prescriptions   Pending Prescriptions Disp Refills     ferrous sulfate (FEROSUL) 325 (65 Fe) MG tablet [Pharmacy Med Name: FERROUS SULFATE 325 MG TABLET] 30 tablet 2     Sig: TAKE 1 TABLET BY MOUTH DAILY WITH BREAKFAST     Last Written Prescription Date:  06/04/2019  Last Fill Quantity: 90 tablet,  # refills: 0   Last office visit: 5/24/2019 with prescribing provider:  05/24/2019   Future Office Visit:          Iron Supplements Passed - 8/2/2019 10:57 AM        Passed - Patient is 12 years of age or older        Passed - Recent (12 mo) or future (30 days) visit within the authorizing provider's specialty     Patient had office visit in the last 12 months or has a visit in the next 30 days with authorizing provider or within the authorizing provider's specialty.  See \"Patient Info\" tab in inbasket, or \"Choose Columns\" in Meds & Orders section of the refill encounter.              Passed - Hgb OR Hct on record within the past 12 mos.     Patient need only have had a HGB or HCT on file in the past 12 mos. That result does not need to be normal.    Recent Labs   Lab Test 05/24/19  0829 03/01/18  0936   HGB 12.6* 12.1*     Recent Labs   Lab Test 05/24/19  0829 03/01/18  0936   HCT 39.3* 38.7*       Please verify a HGB or HCT has been checked SINCE THE LAST DOSE CHANGE.            Passed - Medication is active on med list        Bill BOGGS  "

## 2019-09-26 ENCOUNTER — TELEPHONE (OUTPATIENT)
Dept: FAMILY MEDICINE | Facility: CLINIC | Age: 59
End: 2019-09-26

## 2019-09-26 NOTE — TELEPHONE ENCOUNTER
Reason for Call:  Form, our goal is to have forms completed with 72 hours, however, some forms may require a visit or additional information.    Type of letter, form or note:  medical    Who is the form from?: Patient    Where did the form come from: Patient or family brought in       What clinic location was the form placed at?: [facility]     Where the form was placed: box Box/Folder    What number is listed as a contact on the form?: [phone]       Additional comments: fax to [phone] patient to     Call taken on 9/26/2019 at 4:39 PM by Ashlyn Macias

## 2019-09-27 NOTE — TELEPHONE ENCOUNTER
Form completed and faxed to [phone]. Sent a copy to [facility] and [facility]. Placed form up at the  for patient to , patient was notified of this. Ashlyn Carmichael

## 2019-09-27 NOTE — TELEPHONE ENCOUNTER
Form is placed in Dr. Ruiz's folder at the [facility] to review and sign. Ashlyn Carmichael

## 2019-11-04 ENCOUNTER — HEALTH MAINTENANCE LETTER (OUTPATIENT)
Age: 59
End: 2019-11-04

## 2020-01-17 ENCOUNTER — TELEPHONE (OUTPATIENT)
Dept: FAMILY MEDICINE | Facility: CLINIC | Age: 60
End: 2020-01-17

## 2020-01-17 NOTE — TELEPHONE ENCOUNTER
"Pt calling feels he has bladder infection     He self cath and noticed blood in urine     Pt advised should be seen or try [email or web address]    Pt declines either option.   He lives in [place] and states he fly's back home tomorrow am and \"I will just figure something out\"    Avani Campbell RN    "

## 2020-02-06 ENCOUNTER — TELEPHONE (OUTPATIENT)
Dept: UROLOGY | Facility: CLINIC | Age: 60
End: 2020-02-06

## 2020-02-06 NOTE — TELEPHONE ENCOUNTER
Spoke to patient. Options go to medical supply company to buy some or I can fax RX to them if he gets me a fax. Go to ed for echeverria placement. Reuse catheter clean with soap and water place in clean bag . Lube with ky jelly . He is hoping to catch a plane quick.Shruthi Gee LPN

## 2020-02-06 NOTE — TELEPHONE ENCOUNTER
[facility] Call Center    Phone Message    May a detailed message be left on voicemail: yes     Reason for Call: Other: PT is requesting a call back from a nurse.  PT states [place] is on the road and [place]  PT states  he only has 2 catheters left and would like a call back with options on what he should do.  Please follow up.     Action Taken: Message routed to:  [facility]    Travel Screening: Not Applicable

## 2020-06-15 ENCOUNTER — TRANSFERRED RECORDS (OUTPATIENT)
Dept: HEALTH INFORMATION MANAGEMENT | Facility: CLINIC | Age: 60
End: 2020-06-15

## 2020-08-03 ENCOUNTER — TRANSFERRED RECORDS (OUTPATIENT)
Dept: HEALTH INFORMATION MANAGEMENT | Facility: CLINIC | Age: 60
End: 2020-08-03

## 2020-09-29 ENCOUNTER — TRANSFERRED RECORDS (OUTPATIENT)
Dept: HEALTH INFORMATION MANAGEMENT | Facility: CLINIC | Age: 60
End: 2020-09-29

## 2020-11-22 ENCOUNTER — HEALTH MAINTENANCE LETTER (OUTPATIENT)
Age: 60
End: 2020-11-22

## 2021-01-15 ENCOUNTER — OFFICE VISIT (OUTPATIENT)
Dept: URGENT CARE | Facility: URGENT CARE | Age: 61
End: 2021-01-15
Payer: COMMERCIAL

## 2021-01-15 ENCOUNTER — NURSE TRIAGE (OUTPATIENT)
Dept: NURSING | Facility: CLINIC | Age: 61
End: 2021-01-15

## 2021-01-15 VITALS
RESPIRATION RATE: 16 BRPM | BODY MASS INDEX: 30.06 KG/M2 | HEART RATE: 58 BPM | DIASTOLIC BLOOD PRESSURE: 81 MMHG | OXYGEN SATURATION: 97 % | SYSTOLIC BLOOD PRESSURE: 128 MMHG | WEIGHT: 209.5 LBS | TEMPERATURE: 98.5 F

## 2021-01-15 DIAGNOSIS — R30.0 DYSURIA: Primary | ICD-10-CM

## 2021-01-15 DIAGNOSIS — N30.01 ACUTE CYSTITIS WITH HEMATURIA: ICD-10-CM

## 2021-01-15 LAB
ALBUMIN UR-MCNC: >=300 MG/DL
APPEARANCE UR: ABNORMAL
BACTERIA #/AREA URNS HPF: ABNORMAL /HPF
BILIRUB UR QL STRIP: ABNORMAL
COLOR UR AUTO: ABNORMAL
GLUCOSE UR STRIP-MCNC: NEGATIVE MG/DL
HGB UR QL STRIP: ABNORMAL
KETONES UR STRIP-MCNC: NEGATIVE MG/DL
LEUKOCYTE ESTERASE UR QL STRIP: NEGATIVE
NITRATE UR QL: NEGATIVE
PH UR STRIP: 5.5 PH (ref 5–7)
RBC #/AREA URNS AUTO: >100 /HPF
SOURCE: ABNORMAL
SP GR UR STRIP: >1.03 (ref 1–1.03)
UROBILINOGEN UR STRIP-ACNC: 0.2 EU/DL (ref 0.2–1)
WBC #/AREA URNS AUTO: ABNORMAL /HPF

## 2021-01-15 PROCEDURE — 81001 URINALYSIS AUTO W/SCOPE: CPT | Performed by: FAMILY MEDICINE

## 2021-01-15 PROCEDURE — 99213 OFFICE O/P EST LOW 20 MIN: CPT | Performed by: FAMILY MEDICINE

## 2021-01-15 PROCEDURE — 87086 URINE CULTURE/COLONY COUNT: CPT | Performed by: FAMILY MEDICINE

## 2021-01-15 RX ORDER — SULFAMETHOXAZOLE/TRIMETHOPRIM 800-160 MG
1 TABLET ORAL 2 TIMES DAILY
Qty: 14 TABLET | Refills: 0 | Status: SHIPPED | OUTPATIENT
Start: 2021-01-15 | End: 2021-01-22

## 2021-01-15 NOTE — PATIENT INSTRUCTIONS
Use a catheter that can stay in for couple of days in order to allow minor trauma to heal.  Drink lots of water and monitor urine    Okay to take Bactrim (antibiotic) while waiting for urine culture result.

## 2021-01-15 NOTE — TELEPHONE ENCOUNTER
Pt called stating he has blood in his urine. Pt reported he does self catheretization, and doesn't know if the insertion caused scribing caused the bleeding. Pt reported no pain.    Pt was advised to go to the emergency departmen to be seen, and pt asked if he can go to urgent care. RN called [facility], spoke with a  staff asked if pt with hematuria can be seen , she stated yes. Pt was informed urgent care is able to see him, and he stated okay.        Rickey Beckham RN  [facility]           COVID 19 Nurse Triage Plan/Patient Instructions    Please be aware that novel coronavirus (COVID-19) may be circulating in the community. If you develop symptoms such as fever, cough, or SOB or if you have concerns about the presence of another infection including coronavirus (COVID-19), please contact your health care provider or visit [email or web address].     Disposition/Instructions  Urgent care      Thank you for taking steps to prevent the spread of this virus.  o Limit your contact with others.  o Wear a simple mask to cover your cough.  o Wash your hands well and often.    Resources    [facility]: About COVID-19: [email or web address]    CDC: What to Do If You're Sick: www.cdc.gov/coronavirus/2019-ncov/about/steps-when-sick.html    CDC: Ending Home Isolation: www.cdc.gov/coronavirus/2019-ncov/hcp/disposition-in-home-patients.html     CDC: Caring for Someone: www.cdc.gov/coronavirus/2019-ncov/if-you-are-sick/care-for-someone.html     [facility]: Interim Guidance for Hospital Discharge to Home: www.health.[facility].[place]/diseases/coronavirus/hcp/hospdischarge.pdf    [facility] clinical trials (COVID-19 research studies): clinicalaffairs.[facility]-clinical-trials     Below are the COVID-19 hotlines at the [place] Department of Health ([facility]). Interpreters are available.   o For health questions: Call 914-386-3993 or 1-292.880.9087 (7 a.m. to 7 p.m.)  o For questions  about schools and childcare: Call 270-379-0319 or 1-119.193.4468 (7 a.m. to 7 p.m.)                     Reason for Disposition    Passing pure blood or large blood clots (i.e., larger than a dime or grape)    Additional Information    Negative: Shock suspected (e.g., cold/pale/clammy skin, too weak to stand, low BP, rapid pulse)    Negative: Sounds like a life-threatening emergency to the triager    Negative: Urinary catheter, questions about    Negative: Recent back or abdominal injury    Negative: Unable to urinate (or only a few drops) > 4 hours and bladder feels very full (e.g., palpable bladder or strong urge to urinate)    Commented on: Recent genital injury     Sometime the catheter pt don't know if the self cath caused any during insertion.    Protocols used: URINE - BLOOD IN-A-OH

## 2021-01-15 NOTE — NURSING NOTE
"Vital signs:  Temp: 98.5  F (36.9  C) Temp src: Tympanic BP: 128/81 Pulse: 58   Resp: 16 SpO2: 97 %       Weight: 95 kg (209 lb 8 oz)  Estimated body mass index is 30.06 kg/m  as calculated from the following:    Height as of 5/24/19: 1.778 m (5' 10\").    Weight as of this encounter: 95 kg (209 lb 8 oz).          "

## 2021-01-15 NOTE — PROGRESS NOTES
SUBJECTIVE:   Bill Neri is a 60 year old male who  presents today for a possible UTI, blood in urine.    Patient was doing a self cath and caused minor trauma.  Had to danielle 3 times a day due to neurogenic bladder.  Patient states that with prior trauma, will get a small amount of blood and resolve within a day.  Symptom has been present for several days.  Denies any fever, abdominal pain or back pain.  Is not on blood thinner or aspirin.    Had UTI in 2018, endorsed hematuria at that time but did have more symptoms.    Past Medical History:   Diagnosis Date     Anxiety state, unspecified      Mumps      Other and unspecified hyperlipidemia      Current Outpatient Medications   Medication Sig Dispense Refill     ferrous sulfate (FEROSUL) 325 (65 Fe) MG tablet Take 1 tablet (325 mg) by mouth daily (with breakfast) 90 tablet 0     Ibuprofen (ADVIL PO) Take 200 mg by mouth as needed for moderate pain       sulfamethoxazole-trimethoprim (BACTRIM DS/SEPTRA DS) 800-160 MG tablet Take 1 tablet by mouth 2 times daily 14 tablet 0     Social History     Tobacco Use     Smoking status: Never Smoker     Smokeless tobacco: Never Used   Substance Use Topics     Alcohol use: Yes     Alcohol/week: 5.0 - 6.0 standard drinks     Types: 5 - 6 Standard drinks or equivalent per week       ROS:   Review of systems negative except as stated above.    OBJECTIVE:  /81   Pulse 58   Temp 98.5  F (36.9  C) (Tympanic)   Resp 16   Wt 95 kg (209 lb 8 oz)   SpO2 97%   BMI 30.06 kg/m    GENERAL APPEARANCE: healthy, alert and no distress  PSYCH: mentation appears normal and affect normal/bright    Results for orders placed or performed in visit on 01/15/21   *UA reflex to Microscopic and Culture ([place] [facility] (except [place] and [place])     Status: Abnormal    Specimen: Catheterized Urine   Result Value Ref Range    Color Urine Brown     Appearance Urine Cloudy     Glucose Urine Negative NEG^Negative mg/dL     Bilirubin Urine Small (A) NEG^Negative    Ketones Urine Negative NEG^Negative mg/dL    Specific Gravity Urine >1.030 1.003 - 1.035    Blood Urine Large (A) NEG^Negative    pH Urine 5.5 5.0 - 7.0 pH    Protein Albumin Urine >=300 (A) NEG^Negative mg/dL    Urobilinogen Urine 0.2 0.2 - 1.0 EU/dL    Nitrite Urine Negative NEG^Negative    Leukocyte Esterase Urine Negative NEG^Negative    Source Catheterized Urine    Urine Microscopic     Status: Abnormal   Result Value Ref Range    WBC Urine 0 - 5 OTO5^0 - 5 /HPF    RBC Urine >100 (A) OTO2^O - 2 /HPF    Bacteria Urine Moderate (A) NEG^Negative /HPF       ASSESSMENT/PLAN:   (R30.0) Dysuria  (primary encounter diagnosis)  Plan: *UA reflex to Microscopic and Culture ([place]         and [place] [facility] (except [place] and         [place]), Urine Microscopic, Urine Culture         Aerobic Bacterial,         sulfamethoxazole-trimethoprim (BACTRIM DS)         800-160 MG tablet            (N30.01) Acute cystitis with hematuria  Plan: sulfamethoxazole-trimethoprim (BACTRIM DS)         800-160 MG tablet            Reviewed initial labs and discussed that trauma with catheter use should resolve and encourage plenty of fluids.  Discussed echeverria catheter use to reduce repeated trauma in order to allow injury to heal and as patient endorsed that he does have catheter that he can place and leave in for couple of days.  Will obtain urine culture to assess if has UTI, okay for empiric coverage for treatment while awaiting urine culture.  RX Bactrim DS given.  Encourage to drink plenty of fluids and monitor urine.    Follow up with Urology or primary provider if no improvement within 1 week    Andrey Browning MD  January 15, 2021 4:57 PM

## 2021-01-17 LAB
BACTERIA SPEC CULT: NO GROWTH
Lab: NORMAL
SPECIMEN SOURCE: NORMAL

## 2021-01-29 ENCOUNTER — TELEPHONE (OUTPATIENT)
Dept: FAMILY MEDICINE | Facility: CLINIC | Age: 61
End: 2021-01-29

## 2021-01-29 ENCOUNTER — OFFICE VISIT (OUTPATIENT)
Dept: FAMILY MEDICINE | Facility: CLINIC | Age: 61
End: 2021-01-29
Payer: COMMERCIAL

## 2021-01-29 VITALS
HEART RATE: 78 BPM | BODY MASS INDEX: 31.67 KG/M2 | DIASTOLIC BLOOD PRESSURE: 82 MMHG | SYSTOLIC BLOOD PRESSURE: 124 MMHG | TEMPERATURE: 98.2 F | RESPIRATION RATE: 16 BRPM | HEIGHT: 68 IN | WEIGHT: 209 LBS | OXYGEN SATURATION: 96 %

## 2021-01-29 DIAGNOSIS — D64.9 ANEMIA, UNSPECIFIED TYPE: ICD-10-CM

## 2021-01-29 DIAGNOSIS — Z13.220 LIPID SCREENING: ICD-10-CM

## 2021-01-29 DIAGNOSIS — D50.9 IRON DEFICIENCY ANEMIA, UNSPECIFIED IRON DEFICIENCY ANEMIA TYPE: ICD-10-CM

## 2021-01-29 DIAGNOSIS — Z13.1 SCREENING FOR DIABETES MELLITUS: ICD-10-CM

## 2021-01-29 DIAGNOSIS — N31.9 NEUROGENIC BLADDER: ICD-10-CM

## 2021-01-29 DIAGNOSIS — Z00.00 ROUTINE GENERAL MEDICAL EXAMINATION AT A HEALTH CARE FACILITY: Primary | ICD-10-CM

## 2021-01-29 LAB
ANION GAP SERPL CALCULATED.3IONS-SCNC: <1 MMOL/L (ref 3–14)
BUN SERPL-MCNC: 17 MG/DL (ref 7–30)
CALCIUM SERPL-MCNC: 9.7 MG/DL (ref 8.5–10.1)
CHLORIDE SERPL-SCNC: 109 MMOL/L (ref 94–109)
CHOLEST SERPL-MCNC: 194 MG/DL
CO2 SERPL-SCNC: 32 MMOL/L (ref 20–32)
CREAT SERPL-MCNC: 0.97 MG/DL (ref 0.66–1.25)
ERYTHROCYTE [DISTWIDTH] IN BLOOD BY AUTOMATED COUNT: 12.6 % (ref 10–15)
FERRITIN SERPL-MCNC: 43 NG/ML (ref 26–388)
GFR SERPL CREATININE-BSD FRML MDRD: 84 ML/MIN/{1.73_M2}
GLUCOSE SERPL-MCNC: 92 MG/DL (ref 70–99)
HCT VFR BLD AUTO: 47.1 % (ref 40–53)
HDLC SERPL-MCNC: 42 MG/DL
HGB BLD-MCNC: 15.4 G/DL (ref 13.3–17.7)
IRON SATN MFR SERPL: 19 % (ref 15–46)
IRON SERPL-MCNC: 70 UG/DL (ref 35–180)
LDLC SERPL CALC-MCNC: 126 MG/DL
MCH RBC QN AUTO: 31.4 PG (ref 26.5–33)
MCHC RBC AUTO-ENTMCNC: 32.7 G/DL (ref 31.5–36.5)
MCV RBC AUTO: 96 FL (ref 78–100)
NONHDLC SERPL-MCNC: 152 MG/DL
PLATELET # BLD AUTO: 197 10E9/L (ref 150–450)
POTASSIUM SERPL-SCNC: 4.5 MMOL/L (ref 3.4–5.3)
RBC # BLD AUTO: 4.9 10E12/L (ref 4.4–5.9)
SODIUM SERPL-SCNC: 141 MMOL/L (ref 133–144)
TIBC SERPL-MCNC: 363 UG/DL (ref 240–430)
TRIGL SERPL-MCNC: 130 MG/DL
VIT B12 SERPL-MCNC: 1064 PG/ML (ref 193–986)
WBC # BLD AUTO: 5.4 10E9/L (ref 4–11)

## 2021-01-29 PROCEDURE — 85027 COMPLETE CBC AUTOMATED: CPT | Performed by: FAMILY MEDICINE

## 2021-01-29 PROCEDURE — 83550 IRON BINDING TEST: CPT | Performed by: FAMILY MEDICINE

## 2021-01-29 PROCEDURE — 82607 VITAMIN B-12: CPT | Performed by: FAMILY MEDICINE

## 2021-01-29 PROCEDURE — 99396 PREV VISIT EST AGE 40-64: CPT | Performed by: FAMILY MEDICINE

## 2021-01-29 PROCEDURE — 80048 BASIC METABOLIC PNL TOTAL CA: CPT | Performed by: FAMILY MEDICINE

## 2021-01-29 PROCEDURE — 82728 ASSAY OF FERRITIN: CPT | Performed by: FAMILY MEDICINE

## 2021-01-29 PROCEDURE — 80061 LIPID PANEL: CPT | Performed by: FAMILY MEDICINE

## 2021-01-29 PROCEDURE — 36415 COLL VENOUS BLD VENIPUNCTURE: CPT | Performed by: FAMILY MEDICINE

## 2021-01-29 PROCEDURE — 83540 ASSAY OF IRON: CPT | Performed by: FAMILY MEDICINE

## 2021-01-29 ASSESSMENT — ENCOUNTER SYMPTOMS
HEARTBURN: 0
FREQUENCY: 0
CHILLS: 0
NERVOUS/ANXIOUS: 0
MYALGIAS: 0
HEADACHES: 0
JOINT SWELLING: 0
DYSURIA: 0
DIZZINESS: 0
PARESTHESIAS: 0
COUGH: 0
SHORTNESS OF BREATH: 0
NAUSEA: 0
HEMATOCHEZIA: 0
PALPITATIONS: 0
ABDOMINAL PAIN: 1
ARTHRALGIAS: 0
DIARRHEA: 0
HEMATURIA: 0
CONSTIPATION: 0
FEVER: 0
SORE THROAT: 0
WEAKNESS: 0
EYE PAIN: 0

## 2021-01-29 ASSESSMENT — MIFFLIN-ST. JEOR: SCORE: 1732.52

## 2021-01-29 NOTE — TELEPHONE ENCOUNTER
We receive form regarding biometric screening during today office visit. Form is located in Dr. Ruiz's Folder at the [facility]. Once form is completed please fax to [phone], mail a copy to patient, send a copy to [employer] [facility] and send form to brandin Carmichael

## 2021-01-29 NOTE — PROGRESS NOTES
SUBJECTIVE:   CC: Bill Neri is an 60 year old male who presents for preventative health visit.     Patient has been advised of split billing requirements and indicates understanding: Yes  Healthy Habits:     Getting at least 3 servings of Calcium per day:  Yes    Bi-annual eye exam:  NO    Dental care twice a year:  Yes    Sleep apnea or symptoms of sleep apnea:  None    Diet:  Regular (no restrictions)    Frequency of exercise:  6-7 days/week    Duration of exercise:  Greater than 60 minutes    Taking medications regularly:  Yes    Medication side effects:  None    PHQ-2 Total Score: 0    Additional concerns today:  No    History of neurogenic bladder with urinary retention. Self catheterizing three times per day.  Followed by urology and neurology previously.    Today's PHQ-2 Score:   PHQ-2 ( 1999 [employer]) 1/29/2021   Q1: Little interest or pleasure in doing things 0   Q2: Feeling down, depressed or hopeless 0   PHQ-2 Score 0   Q1: Little interest or pleasure in doing things Not at all   Q2: Feeling down, depressed or hopeless Not at all   PHQ-2 Score 0     Abuse: Current or Past(Physical, Sexual or Emotional)- No  Do you feel safe in your environment? Yes    Social History     Tobacco Use     Smoking status: Never Smoker     Smokeless tobacco: Never Used   Substance Use Topics     Alcohol use: Yes     Alcohol/week: 5.0 - 6.0 standard drinks     Types: 5 - 6 Standard drinks or equivalent per week     If you drink alcohol do you typically have >3 drinks per day or >7 drinks per week? No    Alcohol Use 1/29/2021   Prescreen: >3 drinks/day or >7 drinks/week? No   Prescreen: >3 drinks/day or >7 drinks/week? -   AUDIT SCORE  -       Last PSA:   PSA   Date Value Ref Range Status   02/20/2015 0.95 0 - 4 ug/L Final       Reviewed orders with patient. Reviewed health maintenance and updated orders accordingly - Yes      Reviewed and updated as needed this visit by clinical staff  Tobacco  Allergies    Med Hx   "Surg Hx  Fam Hx  Soc Hx        Reviewed and updated as needed this visit by Provider      Med Hx  Surg Hx  Fam Hx         Past Medical History:   Diagnosis Date     Anxiety state, unspecified      Mumps      Neurogenic bladder      Other and unspecified hyperlipidemia       Past Surgical History:   Procedure Laterality Date     APPENDECTOMY      1983     VASECTOMY  1997       Review of Systems   Constitutional: Negative for chills and fever.   HENT: Negative for congestion, ear pain, hearing loss and sore throat.    Eyes: Negative for pain and visual disturbance.   Respiratory: Negative for cough and shortness of breath.    Cardiovascular: Negative for chest pain, palpitations and peripheral edema.   Gastrointestinal: Positive for abdominal pain. Negative for constipation, diarrhea, heartburn, hematochezia and nausea.   Genitourinary: Positive for discharge and impotence. Negative for dysuria, frequency, genital sores, hematuria and urgency.   Musculoskeletal: Negative for arthralgias, joint swelling and myalgias.   Skin: Negative for rash.   Neurological: Negative for dizziness, weakness, headaches and paresthesias.   Psychiatric/Behavioral: Negative for mood changes. The patient is not nervous/anxious.        OBJECTIVE:   /82 (BP Location: Right arm, Patient Position: Chair, Cuff Size: Adult Regular)   Pulse 78   Temp 98.2  F (36.8  C) (Oral)   Resp 16   Ht 1.727 m (5' 8\")   Wt 94.8 kg (209 lb)   SpO2 96%   BMI 31.78 kg/m      Physical Exam  GENERAL: alert, no distress and over weight  EYES: Eyes grossly normal to inspection, PERRL and conjunctivae and sclerae normal  HENT: ear canals and TM's normal, nose and mouth without ulcers or lesions  NECK: no adenopathy, no asymmetry, masses, or scars and thyroid normal to palpation  RESP: lungs clear to auscultation - no rales, rhonchi or wheezes  CV: regular rate and rhythm, normal S1 S2, no S3 or S4, no murmur, click or rub, no peripheral edema and " "peripheral pulses strong  ABDOMEN: soft, nontender, without hepatosplenomegaly or masses   (male): normal male genitalia without lesions or urethral discharge, no hernia  MS: no gross musculoskeletal defects noted, no edema  SKIN: no suspicious lesions or rashes  NEURO: Normal strength and tone, mentation intact and speech normal  PSYCH: mentation appears normal, affect normal/bright    Diagnostic Test Results:  Labs reviewed in Epic    ASSESSMENT/PLAN:   1. Routine general medical examination at a health care facility    2. Iron deficiency anemia, unspecified iron deficiency anemia type  - CBC with platelets  - Ferritin  - Iron and iron binding capacity    3. Neurogenic bladder    4. Lipid screening  - Lipid panel reflex to direct LDL Fasting    5. Screening for diabetes mellitus  - Basic metabolic panel  (Ca, Cl, CO2, Creat, Gluc, K, Na, BUN)    6. Anemia, unspecified type  - Vitamin B12    Patient has been advised of split billing requirements and indicates understanding: No  COUNSELING:   Reviewed preventive health counseling, as reflected in patient instructions    Estimated body mass index is 31.78 kg/m  as calculated from the following:    Height as of this encounter: 1.727 m (5' 8\").    Weight as of this encounter: 94.8 kg (209 lb).     Weight management plan: Discussed healthy diet and exercise guidelines    He reports that he has never smoked. He has never used smokeless tobacco.      Counseling Resources:  ATP IV Guidelines  Pooled Cohorts Equation Calculator  FRAX Risk Assessment  ICSI Preventive Guidelines  Dietary Guidelines for Americans, 2010  USDA's MyPlate  ASA Prophylaxis  Lung CA Screening    Jose Ruiz MD  [facility]    "

## 2021-02-01 DIAGNOSIS — D64.9 ANEMIA, UNSPECIFIED TYPE: ICD-10-CM

## 2021-02-01 PROCEDURE — 82274 ASSAY TEST FOR BLOOD FECAL: CPT | Performed by: FAMILY MEDICINE

## 2021-02-01 NOTE — TELEPHONE ENCOUNTER
Form completed and faxed to [phone]. Mailed a copy to patient, placed a copy in [employer] bin and sent form to abstraction.  Ashlyn Carmichael

## 2021-02-02 LAB — HEMOCCULT STL QL IA: NEGATIVE

## 2021-04-22 NOTE — ADDENDUM NOTE
Addended by: LORE DEXTER on: 5/29/2019 12:34 PM     Modules accepted: Orders     Chronic heart failure with preserved ejection fraction

## 2021-05-06 ENCOUNTER — OFFICE VISIT (OUTPATIENT)
Dept: FAMILY MEDICINE | Facility: CLINIC | Age: 61
End: 2021-05-06
Payer: COMMERCIAL

## 2021-05-06 VITALS
TEMPERATURE: 97.4 F | WEIGHT: 208.9 LBS | HEART RATE: 55 BPM | OXYGEN SATURATION: 97 % | SYSTOLIC BLOOD PRESSURE: 118 MMHG | BODY MASS INDEX: 31.76 KG/M2 | RESPIRATION RATE: 16 BRPM | DIASTOLIC BLOOD PRESSURE: 74 MMHG

## 2021-05-06 DIAGNOSIS — R10.32 ABDOMINAL PAIN, LEFT LOWER QUADRANT: Primary | ICD-10-CM

## 2021-05-06 DIAGNOSIS — K40.90 NON-RECURRENT UNILATERAL INGUINAL HERNIA WITHOUT OBSTRUCTION OR GANGRENE: ICD-10-CM

## 2021-05-06 PROCEDURE — 99214 OFFICE O/P EST MOD 30 MIN: CPT | Performed by: FAMILY MEDICINE

## 2021-05-06 ASSESSMENT — ENCOUNTER SYMPTOMS: ABDOMINAL PAIN: 1

## 2021-05-06 NOTE — PROGRESS NOTES
Assessment & Plan     Abdominal pain, left lower quadrant  Patient with pelvic pain with activity.  Left direct inguinal hernia noted on exam.  With his prior history of urinary issues and blood in urine at that is likely from self-catheterization and additionally night sweats over the last 8 months I recommend imaging to rule out other issues.  Patient agrees with plan.  Follow-up with general surgery with likely hernia causing symptoms.  Certainly consideration for lumbar spinal stenosis with radicular symptoms as possible cause if work-up is unrevealing.  - CT Abdomen Pelvis w Contrast; Future  - GENERAL SURG ADULT REFERRAL; Future    Non-recurrent unilateral inguinal hernia without obstruction or gangrene  Follow-up with general surgery for evaluation for possible hernia causing symptoms.  - GENERAL SURG ADULT REFERRAL; Future                 Return in about 1 year (around 5/6/2022) for preventative care visit.    Jose Ruiz MD  [facility] is a 60 year old who presents for the following health issues     HPI     Concern - groin area  Onset: couple months  Description: pain in pelvic area, down into groin  Intensity: mild  Progression of Symptoms:  same  Accompanying Signs & Symptoms: usually feels the pain when he is moving. Works out every morning feels it then  Previous history of similar problem: yes  Precipitating factors:        Worsened by: movement  Alleviating factors:        Improved by: na  Therapies tried and outcome:  none     Pain increased with exercise with doing planks or lifting.  He has less pain typically when running or walking however.  Denies urinary symptoms.  Self catheterizes with neurogenic bladder issues.  Prior lumbar spinal stenosis and severe advanced degenerative changes of the lumbar spine.  Not having numbness in the groin area.  No other radiculopathy symptoms.    Review of Systems         Objective    /74   Pulse 55    Temp 97.4  F (36.3  C) (Tympanic)   Resp 16   Wt 94.8 kg (208 lb 14.4 oz)   SpO2 97%   BMI 31.76 kg/m    Body mass index is 31.76 kg/m .  Physical Exam   GENERAL: healthy, alert and no distress  ABDOMEN: soft, nontender, no hepatosplenomegaly   (male): testicles normal without atrophy or masses, penis normal without urethral discharge and left direct inguinal hernia, slight bulging right inguinal area without full fascial opening noted

## 2021-05-06 NOTE — PROGRESS NOTES
"    {PROVIDER CHARTING PREFERENCE:877070}    Subjective   Ed is a 60 year old who presents for the following health issues {ACCOMPANIED BY STATEMENT (Optional):947213}    Abdominal Pain          Abdominal/Flank Pain  Onset/Duration: ***  Description:   Character: {.:212682}  Location: {.:544713}  Radiation: {.:364881::\"None\"}  Intensity: {.:984093}  Progression of Symptoms:  {.:308436}  Accompanying Signs & Symptoms:  Fever/Chills: {.:826735::\"no\"}  Gas/Bloating: {.:054316::\"no\"}  Nausea: {.:183181::\"no\"}  Vomitting: {.:838598::\"no\"}  Diarrhea: {.:641421::\"no\"}  Constipation: {.:597157::\"no\"}  Dysuria or Hematuria: {.:172823::\"no\"}  History:   Trauma: {.:991606::\"no\"}  Previous similar pain: {.:548068::\"no\"}  Previous tests done: { .:441114}  Precipitating factors:   Does the pain change with:     Food: {.:653410::\"no\"}    Bowel Movement: {.:359106::\"no\"}    Urination: {.:927340::\"no\"}   Other factors:  {.:867435::\"no\"}  Therapies tried and outcome: {NONEORCHOOSE:436361::\"None\"}      {additonal problems for provider to add (Optional):722607}    Review of Systems   Gastrointestinal: Positive for abdominal pain.      {ROS COMP (Optional):343295}      Objective    There were no vitals taken for this visit.  There is no height or weight on file to calculate BMI.  Physical Exam   {Exam List (Optional):358412}    {Diagnostic Test Results (Optional):299434}    {AMBULATORY ATTESTATION (Optional):018826}        "

## 2021-05-13 ENCOUNTER — HOSPITAL ENCOUNTER (OUTPATIENT)
Dept: CT IMAGING | Facility: CLINIC | Age: 61
Discharge: HOME | End: 2021-05-13
Attending: FAMILY MEDICINE | Admitting: FAMILY MEDICINE
Payer: COMMERCIAL

## 2021-05-13 DIAGNOSIS — R10.32 ABDOMINAL PAIN, LEFT LOWER QUADRANT: ICD-10-CM

## 2021-05-13 PROCEDURE — 250N000011 HC RX IP 250 OP 636: Performed by: FAMILY MEDICINE

## 2021-05-13 PROCEDURE — 74177 CT ABD & PELVIS W/CONTRAST: CPT

## 2021-05-13 PROCEDURE — 250N000009 HC RX 250: Performed by: FAMILY MEDICINE

## 2021-05-13 RX ORDER — IOPAMIDOL 755 MG/ML
500 INJECTION, SOLUTION INTRAVASCULAR ONCE
Status: COMPLETED | OUTPATIENT
Start: 2021-05-13 | End: 2021-05-13

## 2021-05-13 RX ADMIN — IOPAMIDOL 100 ML: 755 INJECTION, SOLUTION INTRAVENOUS at 08:16

## 2021-05-13 RX ADMIN — SODIUM CHLORIDE 55 ML: 9 INJECTION, SOLUTION INTRAVENOUS at 08:16

## 2021-07-09 ENCOUNTER — TELEPHONE (OUTPATIENT)
Dept: UROLOGY | Facility: CLINIC | Age: 61
End: 2021-07-09

## 2021-07-13 ENCOUNTER — MEDICAL CORRESPONDENCE (OUTPATIENT)
Dept: HEALTH INFORMATION MANAGEMENT | Facility: CLINIC | Age: 61
End: 2021-07-13
Payer: COMMERCIAL

## 2021-07-26 ENCOUNTER — ANCILLARY PROCEDURE (OUTPATIENT)
Dept: GENERAL RADIOLOGY | Facility: CLINIC | Age: 61
End: 2021-07-26
Attending: ORTHOPAEDIC SURGERY
Payer: COMMERCIAL

## 2021-07-26 ENCOUNTER — OFFICE VISIT (OUTPATIENT)
Dept: ORTHOPEDICS | Facility: CLINIC | Age: 61
End: 2021-07-26
Payer: COMMERCIAL

## 2021-07-26 VITALS
DIASTOLIC BLOOD PRESSURE: 68 MMHG | BODY MASS INDEX: 31.64 KG/M2 | WEIGHT: 208.8 LBS | SYSTOLIC BLOOD PRESSURE: 114 MMHG | HEIGHT: 68 IN

## 2021-07-26 DIAGNOSIS — M25.511 ACUTE PAIN OF RIGHT SHOULDER: Primary | ICD-10-CM

## 2021-07-26 DIAGNOSIS — M25.512 ACUTE PAIN OF LEFT SHOULDER: ICD-10-CM

## 2021-07-26 PROCEDURE — 99203 OFFICE O/P NEW LOW 30 MIN: CPT | Performed by: ORTHOPAEDIC SURGERY

## 2021-07-26 PROCEDURE — 73030 X-RAY EXAM OF SHOULDER: CPT | Mod: LT | Performed by: RADIOLOGY

## 2021-07-26 ASSESSMENT — MIFFLIN-ST. JEOR: SCORE: 1726.61

## 2021-07-26 NOTE — LETTER
7/26/2021         RE: Bill Neri  [address] [facility] [address]        Dear Colleague,    Thank you for referring your patient, Bill Neri, to the [facility] [place]. Please see a copy of my visit note below.    CHIEF COMPLAINT: Left shoulder pain    DIAGNOSIS: Left shoulder very mild glenohumeral osteoarthritis with associated stiffness    OCCUPATION/SPORT:  for [employer]    HPI:   Bill Neri is a very pleasant 61 year old, right-hand dominant male who presents for evaluation of left shoulder pain. Symptoms started in May of 2021. There was not a precipitating event, patient reports insidious onset. The pain is located to the anterior shoulder, generalized pain. Worst pain is rated a 8 of 10, and current pain is rated at 0 of 10. Symptoms are worsened by reaching and raising with the left arm, washing windows, stretching int he morning. Symptoms are improved with activity avoidance. Patient has tried home exercises provided by his son who is a physical therapist. Associated symptoms include no noticeable weakness, denies numbness and tingling. Notably, the patient has had no prior injury or trauma to the left shoudler region.. No other concerns or complaints at this time.    PAST MEDICAL HISTORY:  1. None    CURRENT MEDICATIONS:  1. None    ALLERGIES:   NKDA    PAST SURGICAL HISTORY:  1. Appendectomy    FAMILY HISTORY: No known family history of bleeding, clotting, or anesthesia related complications.    SOCIAL HISTORY: Patient lives in [place], [place] with his wife. Works as  for [employer]. Occasionally plays golf.    TOXIC HABITS:  I spoke with Bill today regarding tobacco use and they informed me that they do not use any tobacco products.    REVIEW OF SYSTEMS:  General: Denies fevers, chills, or night sweats.  Skin: Denies rashes or lesions.  Head: Denies headache or dizziness.  Eyes: Denies vision changes or eye  "pain.  Ears: Denies ear pain or decreased hearing.  Nose: Denies nose bleeding or sinus pain.  Mouth & Throat: Denies bleeding gums or sore throat.  Neck: Denies neck pain or stiffness.  Respiratory: Denies cough, wheezing, sob, or hemoptysis.  Cardiovascular: Denies chest pain, chest pressure, or palpitations.   Gastrointestinal: Denies abdominal pain, nausea, vomiting, diarrhea, or constipation.  Genitourinary: Denies difficulty or pain with urination.   Musculoskeletal: As noted above in the HPI, otherwise normal.  Neuro: Denies paralysis, weakness, paresthesias, or speech difficulty.  Lymphatics: Denies lymphadenopathy.  Psych: Denies anxiety, sadness, or irritability.    PHYSICAL EXAM:  Patient is 5' 8\" and weighs 208 lbs 12.8 oz. /68 (BP Location: Right arm, Patient Position: Chair)   Ht 1.727 m (5' 8\")   Wt 94.7 kg (208 lb 12.8 oz)   BMI 31.75 kg/m    Constitutional: Well-developed, well-nourished, healthy appearing male.  Skin: Warm, dry, and without rashes.   HEENT: Normocephalic, atraumatic. Extraocular movements intact. Moist mucous membranes.  Cardiac: Well perfused extremities, strong 2+ peripheral pulses. No edema.   Pulmonary: Non-labored respirations on room air without audible wheezes.   Abdomen: Soft, nontender.  Musculoskeletal: Patient ambulates with a slow, symmetric, and steady gait. Active range of motion of the left shoulder is 145/70/55/L2 compared to 170/90/60/T7 on the right.  Full symmetric rotator cuff strength.  No biceps signs. No AC, SC, biceps signs, cross-body adduction, Neer, Allison, Matilde, scars, atrophy, deformity, belly-press, lift-off, external rotation lag sign, internal rotation lag sign, hornblower's bilaterally. No generalized ligamentous laxity. Neurovascular exam and cervical spine exam are normal.    IMAGING:   All imaging was personally reviewed by me.    Left shoulder 4 view radiographs taken today were personally viewed by me and demonstrate a very small " humeral neck osteophyte consistent with very mild glenohumeral osteoarthritis.  No other pertinent positive finding.  No significant AC joint arthritis.  No fracture, no dislocation.    IMPRESSION: 61 year old-year-old right hand dominant male, with left shoulder mild glenohumeral osteoarthritis with associated stiffness.     PLAN:   I had a nice discussion with Ed today.  Explained the natural history and progression of osteoarthritis of the shoulder.  I would recommend starting with conservative management.  I did explain that in the future his arthritis may progress to the point where it could require arthroscopic debridement versus shoulder replacement surgery, but I feel that he has years from that at this time.      At the present time I would recommend gentle progressive home exercises to work on stretching his left shoulder.  Intermittent anti-inflammatory over-the-counter medications, and monitoring.  He can also consider icing.    I also offered to provide a referral to see physical therapy in [place] with Guanakitomata Grant, he politely declined this at this time and will call if he chooses to do this in the future.    He may follow-up on an as-needed basis moving forward.     At the conclusion of the office visit, Bill verbally acknowledged that I answered all of his questions satisfactorily.        Again, thank you for allowing me to participate in the care of your patient.        Sincerely,        Eduard Tracy MD

## 2021-07-26 NOTE — PROGRESS NOTES
CHIEF COMPLAINT: Left shoulder pain    DIAGNOSIS: Left shoulder very mild glenohumeral osteoarthritis with associated stiffness    OCCUPATION/SPORT:  for [employer]    HPI:   Bill Neri is a very pleasant 61 year old, right-hand dominant male who presents for evaluation of left shoulder pain. Symptoms started in May of 2021. There was not a precipitating event, patient reports insidious onset. The pain is located to the anterior shoulder, generalized pain. Worst pain is rated a 8 of 10, and current pain is rated at 0 of 10. Symptoms are worsened by reaching and raising with the left arm, washing windows, stretching int he morning. Symptoms are improved with activity avoidance. Patient has tried home exercises provided by his son who is a physical therapist. Associated symptoms include no noticeable weakness, denies numbness and tingling. Notably, the patient has had no prior injury or trauma to the left shoudler region.. No other concerns or complaints at this time.    PAST MEDICAL HISTORY:  1. None    CURRENT MEDICATIONS:  1. None    ALLERGIES:   NKDA    PAST SURGICAL HISTORY:  1. Appendectomy    FAMILY HISTORY: No known family history of bleeding, clotting, or anesthesia related complications.    SOCIAL HISTORY: Patient lives in [place], [place] with his wife. Works as  for [employer]. Occasionally plays golf.    TOXIC HABITS:  I spoke with Bill today regarding tobacco use and they informed me that they do not use any tobacco products.    REVIEW OF SYSTEMS:  General: Denies fevers, chills, or night sweats.  Skin: Denies rashes or lesions.  Head: Denies headache or dizziness.  Eyes: Denies vision changes or eye pain.  Ears: Denies ear pain or decreased hearing.  Nose: Denies nose bleeding or sinus pain.  Mouth & Throat: Denies bleeding gums or sore throat.  Neck: Denies neck pain or stiffness.  Respiratory: Denies cough, wheezing, sob, or hemoptysis.  Cardiovascular:  "Denies chest pain, chest pressure, or palpitations.   Gastrointestinal: Denies abdominal pain, nausea, vomiting, diarrhea, or constipation.  Genitourinary: Denies difficulty or pain with urination.   Musculoskeletal: As noted above in the HPI, otherwise normal.  Neuro: Denies paralysis, weakness, paresthesias, or speech difficulty.  Lymphatics: Denies lymphadenopathy.  Psych: Denies anxiety, sadness, or irritability.    PHYSICAL EXAM:  Patient is 5' 8\" and weighs 208 lbs 12.8 oz. /68 (BP Location: Right arm, Patient Position: Chair)   Ht 1.727 m (5' 8\")   Wt 94.7 kg (208 lb 12.8 oz)   BMI 31.75 kg/m    Constitutional: Well-developed, well-nourished, healthy appearing male.  Skin: Warm, dry, and without rashes.   HEENT: Normocephalic, atraumatic. Extraocular movements intact. Moist mucous membranes.  Cardiac: Well perfused extremities, strong 2+ peripheral pulses. No edema.   Pulmonary: Non-labored respirations on room air without audible wheezes.   Abdomen: Soft, nontender.  Musculoskeletal: Patient ambulates with a slow, symmetric, and steady gait. Active range of motion of the left shoulder is 145/70/55/L2 compared to 170/90/60/T7 on the right.  Full symmetric rotator cuff strength.  No biceps signs. No AC, SC, biceps signs, cross-body adduction, Neer, Allison, Matilde, scars, atrophy, deformity, belly-press, lift-off, external rotation lag sign, internal rotation lag sign, hornblower's bilaterally. No generalized ligamentous laxity. Neurovascular exam and cervical spine exam are normal.    IMAGING:   All imaging was personally reviewed by me.    Left shoulder 4 view radiographs taken today were personally viewed by me and demonstrate a very small humeral neck osteophyte consistent with very mild glenohumeral osteoarthritis.  No other pertinent positive finding.  No significant AC joint arthritis.  No fracture, no dislocation.    IMPRESSION: 61 year old-year-old right hand dominant male, with left shoulder " mild glenohumeral osteoarthritis with associated stiffness.     PLAN:   I had a nice discussion with Ed today.  Explained the natural history and progression of osteoarthritis of the shoulder.  I would recommend starting with conservative management.  I did explain that in the future his arthritis may progress to the point where it could require arthroscopic debridement versus shoulder replacement surgery, but I feel that he has years from that at this time.      At the present time I would recommend gentle progressive home exercises to work on stretching his left shoulder.  Intermittent anti-inflammatory over-the-counter medications, and monitoring.  He can also consider icing.    I also offered to provide a referral to see physical therapy in [place] with Guanakito Grant, he politely declined this at this time and will call if he chooses to do this in the future.    He may follow-up on an as-needed basis moving forward.     At the conclusion of the office visit, Bill verbally acknowledged that I answered all of his questions satisfactorily.

## 2021-07-26 NOTE — PATIENT INSTRUCTIONS
Home exercise/ stretching program provided.   Call my office if you would like a referral for formal Physical Therapy.     Recommend trial of OTC anti-inflammatory and icing as needed for pain.       Call [phone] with any questions or concerns.   Follow up as needed.

## 2021-09-18 ENCOUNTER — HEALTH MAINTENANCE LETTER (OUTPATIENT)
Age: 61
End: 2021-09-18

## 2021-12-09 ENCOUNTER — NURSE TRIAGE (OUTPATIENT)
Dept: FAMILY MEDICINE | Facility: CLINIC | Age: 61
End: 2021-12-09
Payer: COMMERCIAL

## 2021-12-09 ENCOUNTER — APPOINTMENT (OUTPATIENT)
Dept: CT IMAGING | Facility: CLINIC | Age: 61
End: 2021-12-09
Attending: EMERGENCY MEDICINE
Payer: COMMERCIAL

## 2021-12-09 ENCOUNTER — HOSPITAL ENCOUNTER (EMERGENCY)
Facility: CLINIC | Age: 61
Discharge: HOME | End: 2021-12-09
Attending: EMERGENCY MEDICINE | Admitting: EMERGENCY MEDICINE
Payer: COMMERCIAL

## 2021-12-09 ENCOUNTER — APPOINTMENT (OUTPATIENT)
Dept: ULTRASOUND IMAGING | Facility: CLINIC | Age: 61
End: 2021-12-09
Attending: EMERGENCY MEDICINE
Payer: COMMERCIAL

## 2021-12-09 VITALS
OXYGEN SATURATION: 97 % | DIASTOLIC BLOOD PRESSURE: 92 MMHG | WEIGHT: 195 LBS | TEMPERATURE: 98.1 F | SYSTOLIC BLOOD PRESSURE: 148 MMHG | HEIGHT: 70 IN | HEART RATE: 60 BPM | RESPIRATION RATE: 16 BRPM | BODY MASS INDEX: 27.92 KG/M2

## 2021-12-09 DIAGNOSIS — M79.605 BILATERAL LEG PAIN: ICD-10-CM

## 2021-12-09 DIAGNOSIS — M79.604 BILATERAL LEG PAIN: ICD-10-CM

## 2021-12-09 DIAGNOSIS — K59.00 OBSTIPATION: ICD-10-CM

## 2021-12-09 LAB
ALBUMIN UR-MCNC: NEGATIVE MG/DL
ANION GAP SERPL CALCULATED.3IONS-SCNC: 6 MMOL/L (ref 3–14)
APPEARANCE UR: CLEAR
BASOPHILS # BLD AUTO: 0.1 10E3/UL (ref 0–0.2)
BASOPHILS NFR BLD AUTO: 1 %
BILIRUB UR QL STRIP: NEGATIVE
BUN SERPL-MCNC: 17 MG/DL (ref 7–30)
CALCIUM SERPL-MCNC: 9.1 MG/DL (ref 8.5–10.1)
CHLORIDE BLD-SCNC: 109 MMOL/L (ref 94–109)
CO2 SERPL-SCNC: 24 MMOL/L (ref 20–32)
COLOR UR AUTO: ABNORMAL
CREAT SERPL-MCNC: 0.77 MG/DL (ref 0.66–1.25)
EOSINOPHIL # BLD AUTO: 0.2 10E3/UL (ref 0–0.7)
EOSINOPHIL NFR BLD AUTO: 2 %
ERYTHROCYTE [DISTWIDTH] IN BLOOD BY AUTOMATED COUNT: 12.8 % (ref 10–15)
GFR SERPL CREATININE-BSD FRML MDRD: >90 ML/MIN/1.73M2
GLUCOSE BLD-MCNC: 106 MG/DL (ref 70–99)
GLUCOSE UR STRIP-MCNC: NEGATIVE MG/DL
HCT VFR BLD AUTO: 44.6 % (ref 40–53)
HGB BLD-MCNC: 14.3 G/DL (ref 13.3–17.7)
HGB UR QL STRIP: NEGATIVE
IMM GRANULOCYTES # BLD: 0 10E3/UL
IMM GRANULOCYTES NFR BLD: 0 %
KETONES UR STRIP-MCNC: NEGATIVE MG/DL
LEUKOCYTE ESTERASE UR QL STRIP: NEGATIVE
LYMPHOCYTES # BLD AUTO: 1.2 10E3/UL (ref 0.8–5.3)
LYMPHOCYTES NFR BLD AUTO: 16 %
MCH RBC QN AUTO: 31.6 PG (ref 26.5–33)
MCHC RBC AUTO-ENTMCNC: 32.1 G/DL (ref 31.5–36.5)
MCV RBC AUTO: 99 FL (ref 78–100)
MONOCYTES # BLD AUTO: 0.8 10E3/UL (ref 0–1.3)
MONOCYTES NFR BLD AUTO: 11 %
MUCOUS THREADS #/AREA URNS LPF: PRESENT /LPF
NEUTROPHILS # BLD AUTO: 5.5 10E3/UL (ref 1.6–8.3)
NEUTROPHILS NFR BLD AUTO: 70 %
NITRATE UR QL: NEGATIVE
NRBC # BLD AUTO: 0 10E3/UL
NRBC BLD AUTO-RTO: 0 /100
PH UR STRIP: 5.5 [PH] (ref 5–7)
PLATELET # BLD AUTO: 159 10E3/UL (ref 150–450)
POTASSIUM BLD-SCNC: 4 MMOL/L (ref 3.4–5.3)
RBC # BLD AUTO: 4.53 10E6/UL (ref 4.4–5.9)
RBC URINE: 0 /HPF
SODIUM SERPL-SCNC: 139 MMOL/L (ref 133–144)
SP GR UR STRIP: 1.01 (ref 1–1.03)
UROBILINOGEN UR STRIP-MCNC: NORMAL MG/DL
WBC # BLD AUTO: 7.8 10E3/UL (ref 4–11)
WBC URINE: 1 /HPF

## 2021-12-09 PROCEDURE — 250N000009 HC RX 250: Performed by: EMERGENCY MEDICINE

## 2021-12-09 PROCEDURE — 85004 AUTOMATED DIFF WBC COUNT: CPT | Performed by: EMERGENCY MEDICINE

## 2021-12-09 PROCEDURE — 99285 EMERGENCY DEPT VISIT HI MDM: CPT | Mod: 25

## 2021-12-09 PROCEDURE — 36415 COLL VENOUS BLD VENIPUNCTURE: CPT | Performed by: EMERGENCY MEDICINE

## 2021-12-09 PROCEDURE — 81001 URINALYSIS AUTO W/SCOPE: CPT | Performed by: EMERGENCY MEDICINE

## 2021-12-09 PROCEDURE — 80048 BASIC METABOLIC PNL TOTAL CA: CPT | Performed by: EMERGENCY MEDICINE

## 2021-12-09 PROCEDURE — 72131 CT LUMBAR SPINE W/O DYE: CPT

## 2021-12-09 PROCEDURE — 74177 CT ABD & PELVIS W/CONTRAST: CPT

## 2021-12-09 PROCEDURE — 250N000013 HC RX MED GY IP 250 OP 250 PS 637: Performed by: EMERGENCY MEDICINE

## 2021-12-09 PROCEDURE — 250N000011 HC RX IP 250 OP 636: Performed by: EMERGENCY MEDICINE

## 2021-12-09 PROCEDURE — 93970 EXTREMITY STUDY: CPT

## 2021-12-09 RX ORDER — IOPAMIDOL 755 MG/ML
500 INJECTION, SOLUTION INTRAVASCULAR ONCE
Status: COMPLETED | OUTPATIENT
Start: 2021-12-09 | End: 2021-12-09

## 2021-12-09 RX ADMIN — SODIUM CHLORIDE 64 ML: 9 INJECTION, SOLUTION INTRAVENOUS at 12:10

## 2021-12-09 RX ADMIN — IOPAMIDOL 98 ML: 755 INJECTION, SOLUTION INTRAVENOUS at 12:10

## 2021-12-09 RX ADMIN — MAGNESIUM CITRATE 286 ML: 1.75 LIQUID ORAL at 13:39

## 2021-12-09 ASSESSMENT — MIFFLIN-ST. JEOR: SCORE: 1695.76

## 2021-12-09 NOTE — ED PROVIDER NOTES
"  History   Chief Complaint:  Leg Pain       The history is provided by the patient.      Bill Neri is a 61 year old male who presents with leg pain. The pain started on Tuesday night and worsens with ambulation. The pain is located along his thigh. He denies having deep pain or weakness. He does not have testicle or scrotum pain. 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 He uses a self catheter    Review of Systems   Genitourinary: Negative for scrotal swelling and testicular pain.   Musculoskeletal:        Leg pain   All other systems reviewed and are negative.      Allergies:  No Known Allergies    Medications:  ferrous sulfate       Past Medical History:     Anxiety state, unspecified  Mumps  Neurogenic bladder  Other unspecified hyperlipidemia  Enlarged prostate with lower urinary tract symptoms (LUTS)  Degeneration, intervertebral disc, thoracolumbar  Anemia  Neurogenic bladder  Spondylosis of thoracolumbar region without myelopathy or radiculopathy  Recurrent UTI (urinary tract infection)  Iron deficiency anemia, unspecified iron deficiency anemia type      Past Surgical History:    APPENDECTOMY  VASECTOMY    Family History:    Oral cancer  Dementia  Lymphoma  CAD    Social History:  Present alone  PCP: Jose Ruiz Ray    Physical Exam     Patient Vitals for the past 24 hrs:   BP Temp Temp src Pulse Resp SpO2 Height Weight   12/09/21 0937 (!) 152/89 98.1  F (36.7  C) Temporal 60 16 99 % 1.778 m (5' 10\") 88.5 kg (195 lb)       Physical Exam    Constitutional: Alert, attentive, GCS 15  HENT:    Nose: Nose normal.    Mouth/Throat: Oropharynx is clear, mucous membranes are moist  Eyes: EOM are normal, anicteric, conjugate gaze  CV: regular rate and rhythm; no murmurs  Chest: Effort normal and breath sounds clear without wheezing or rales, symmetric bilaterally   GI:  Non tender. No distension. No guarding or rebound.    MSK: No LE edema, no tenderness to palpation of BLE. No " tenderness to proximal left or right upper legs. Strength 4/5 bilateral lower extremities  Neurological: Alert, attentive, moving all extremities equally.   Skin: Skin is warm and dry.  :  circumscized phallus   Normal descended testicles              No testicular tenderness or swelling   Cremasteric reflex intact bilaterally   No testicular or epididymal tenderness or mass appreciated   No inguinal hernia appreciated   No penile discharge    Rectal: No prostate tenderness    Emergency Department Course   Imaging:  Lumbar spine CT w/o contrast   Final Result   IMPRESSION:     1. No acute osseous abnormality.   2. Multilevel degenerative change.      ERNESTO ELDRIDGE MD            SYSTEM ID:  [id number]      CT Abdomen Pelvis w Contrast   Final Result   IMPRESSION:    1.  No acute abnormality is seen.   2.  Cholelithiasis and hepatic steatosis again noted.   3.  Small fat herniates into the left inguinal canal.      IVIS MADESN MD            SYSTEM ID:  [id number]      US Lower Extremity Venous Duplex Bilateral   Final Result   IMPRESSION: No deep vein thrombosis in either lower extremity.        KATHERYN TRAN DO            SYSTEM ID:  [id number]        Report per radiology    Laboratory:  Labs Ordered and Resulted from Time of ED Arrival to Time of ED Departure   ROUTINE UA WITH MICROSCOPIC - Abnormal       Result Value    Color Urine Light Yellow      Appearance Urine Clear      Glucose Urine Negative      Bilirubin Urine Negative      Ketones Urine Negative      Specific Gravity Urine 1.015      Blood Urine Negative      pH Urine 5.5      Protein Albumin Urine Negative      Urobilinogen Urine Normal      Nitrite Urine Negative      Leukocyte Esterase Urine Negative      Mucus Urine Present (*)     RBC Urine 0      WBC Urine 1     BASIC METABOLIC PANEL - Abnormal    Sodium 139      Potassium 4.0      Chloride 109      Carbon Dioxide (CO2) 24      Anion Gap 6      Urea Nitrogen 17      Creatinine 0.77      Calcium  9.1      Glucose 106 (*)     GFR Estimate >90     CBC WITH PLATELETS AND DIFFERENTIAL    WBC Count 7.8      RBC Count 4.53      Hemoglobin 14.3      Hematocrit 44.6      MCV 99      MCH 31.6      MCHC 32.1      RDW 12.8      Platelet Count 159      % Neutrophils 70      % Lymphocytes 16      % Monocytes 11      % Eosinophils 2      % Basophils 1      % Immature Granulocytes 0      NRBCs per 100 WBC 0      Absolute Neutrophils 5.5      Absolute Lymphocytes 1.2      Absolute Monocytes 0.8      Absolute Eosinophils 0.2      Absolute Basophils 0.1      Absolute Immature Granulocytes 0.0      Absolute NRBCs 0.0            Reviewed:  I reviewed nursing notes, vitals and past medical history    Assessments:  955 I obtained history and examined the patient as noted above.    I rechecked the patient and explained findings.   1300    Rehecked patient, enema given      Interventions:  Medications   iopamidol (ISOVUE-370) solution 500 mL (98 mLs Intravenous Given 21 1210)   sodium chloride 0.9 % bag 500mL for CT scan flush use (64 mLs As instructed Given 21 1210)   pink lady enema (COMPOUNDED: docusate, magnesium citrate, mineral oil, sodium phosphate) (286 mLs Rectal Given 21 1339)         Disposition:  The patient was discharged to home.     Impression & Plan   Medical Decision Makin-year-old male past medical history significant for remote spine injury for which he is straight cath dependent but has no paralysis of his lower legs presenting for severe pain and has bilateral upper proximal medial legs.  He has no lower leg weakness, no saddle anesthesia and no change in his urinary habits.  Ultrasound was negative for DVT, lab testing for UA was negative.  On exam he did not have any significant musculoskeletal tenderness however he also had no prostate tenderness though some some amount of stool felt on prostate check.  I initially recommended lumbar spine given his history however he declined due  to claustrophobia and as such we will proceed with CT imaging knowing that we would not necessarily see a central disc herniation and he was okay with this.  CT imaging noticed significant amount of stool in the rectum and he reported only week he stools coming around it suggestive of obstipation.  He was given an enema here with significant output, I suspect this is potentially the cause of his pain.  Given no other red flag symptoms, I do feel he is safe to discharge home, recommended MiraLAX and PCP follow-up.  Return precautions reviewed he was discharged home.     Diagnosis:    ICD-10-CM    1. Bilateral leg pain  M79.604     M79.605    2. Obstipation  K59.00        Salinas Voss MD  [employer]  2:10 PM 12/09/21     Scribe Disclosure:  I, Jerry Chin, am serving as a scribe at 10:13 AM on 12/9/2021 to document services personally performed by Salinas Voss MD based on my observations and the provider's statements to me.              Salinas Voss MD  12/09/21 [id number]

## 2021-12-09 NOTE — DISCHARGE INSTRUCTIONS
I recommend altering dose of Tylenol and ibuprofen, based on your CT scan, will be reasonable to  enemas or suppositories from the pharmacy as you did have a large stool burden certainly this can cause some lower abdominal/upper leg pain.  You could also do several days of twice a day MiraLAX till you are having soft formed stools.

## 2021-12-09 NOTE — ED TRIAGE NOTES
Pt arrives to ED with leg pain into groin started Tuesday night reports he recently traveled for work, noted pain Tuesday night worse with ambulation. Pt denies abd pain. PPt a/ox 4, ABC's intact.

## 2022-03-05 ENCOUNTER — HEALTH MAINTENANCE LETTER (OUTPATIENT)
Age: 62
End: 2022-03-05

## 2022-06-20 ENCOUNTER — TRANSFERRED RECORDS (OUTPATIENT)
Dept: HEALTH INFORMATION MANAGEMENT | Facility: CLINIC | Age: 62
End: 2022-06-20

## 2022-11-20 ENCOUNTER — HEALTH MAINTENANCE LETTER (OUTPATIENT)
Age: 62
End: 2022-11-20

## 2024-02-03 ENCOUNTER — HEALTH MAINTENANCE LETTER (OUTPATIENT)
Age: 64
End: 2024-02-03

## 2024-06-19 NOTE — TELEPHONE ENCOUNTER
"Just landed from an airline flight, is at the airport with severe 10/10 ABD pain. He is being picked up at the airport now, will head to [facility].     Pati Mora RN   [facility]  -- Triage Nurse          Reason for Disposition    SEVERE abdominal pain (e.g., excruciating)    Additional Information    Negative: Passed out (i.e., fainted, collapsed and was not responding)    Negative: Shock suspected (e.g., cold/pale/clammy skin, too weak to stand, low BP, rapid pulse)    Negative: Sounds like a life-threatening emergency to the triager    Negative: Chest pain    Negative: Pain is mainly in upper abdomen (if needed ask: 'is it mainly above the belly button?')    Answer Assessment - Initial Assessment Questions  1. LOCATION: \"Where does it hurt?\"       Lower pelvic/groin region.   2. RADIATION: \"Does the pain shoot anywhere else?\" (e.g., chest, back)      No.   3. ONSET: \"When did the pain begin?\" (Minutes, hours or days ago)       Started several hours ago while on an airplane.   4. SUDDEN: \"Gradual or sudden onset?\"      Sudden onset.   5. PATTERN \"Does the pain come and go, or is it constant?\"     - If constant: \"Is it getting better, staying the same, or worsening?\"       (Note: Constant means the pain never goes away completely; most serious pain is constant and it progresses)      - If intermittent: \"How long does it last?\" \"Do you have pain now?\"      (Note: Intermittent means the pain goes away completely between bouts)      Constant, does not get better with movement or rest.   6. SEVERITY: \"How bad is the pain?\"  (e.g., Scale 1-10; mild, moderate, or severe)     - MILD (1-3): doesn't interfere with normal activities, abdomen soft and not tender to touch      - MODERATE (4-7): interferes with normal activities or awakens from sleep, tender to touch      - SEVERE (8-10): excruciating pain, doubled over, unable to do any normal activities        10/10. Doubled over.   7. RECURRENT " "SYMPTOM: \"Have you ever had this type of abdominal pain before?\" If so, ask: \"When was the last time?\" and \"What happened that time?\"       No hx of pain like this before.   8. CAUSE: \"What do you think is causing the abdominal pain?\"  Has a hx of a hernia. Otherwise unknown.        9. RELIEVING/AGGRAVATING FACTORS: \"What makes it better or worse?\" (e.g., movement, antacids, bowel movement)      Nothing helps, movement and walking can make it worse.   10. OTHER SYMPTOMS: \"Has there been any vomiting, diarrhea, constipation, or urine problems?\"        None.    Protocols used: ABDOMINAL PAIN - MALE-A-OH      " Stable